# Patient Record
Sex: MALE | Race: BLACK OR AFRICAN AMERICAN | NOT HISPANIC OR LATINO | Employment: UNEMPLOYED | URBAN - METROPOLITAN AREA
[De-identification: names, ages, dates, MRNs, and addresses within clinical notes are randomized per-mention and may not be internally consistent; named-entity substitution may affect disease eponyms.]

---

## 2023-05-12 LAB
ALANINE AMINOTRANSFERASE (SGPT) (U/L) IN SER/PLAS: 21 U/L (ref 10–52)
ALBUMIN (G/DL) IN SER/PLAS: 4.3 G/DL (ref 3.4–5)
ALKALINE PHOSPHATASE (U/L) IN SER/PLAS: 81 U/L (ref 33–120)
ANION GAP IN SER/PLAS: 15 MMOL/L (ref 10–20)
ASPARTATE AMINOTRANSFERASE (SGOT) (U/L) IN SER/PLAS: 15 U/L (ref 9–39)
BASOPHILS (10*3/UL) IN BLOOD BY AUTOMATED COUNT: 0.06 X10E9/L (ref 0–0.1)
BASOPHILS/100 LEUKOCYTES IN BLOOD BY AUTOMATED COUNT: 0.9 % (ref 0–2)
BILIRUBIN TOTAL (MG/DL) IN SER/PLAS: 0.3 MG/DL (ref 0–1.2)
CALCIUM (MG/DL) IN SER/PLAS: 9.8 MG/DL (ref 8.6–10.6)
CARBON DIOXIDE, TOTAL (MMOL/L) IN SER/PLAS: 29 MMOL/L (ref 21–32)
CHLORIDE (MMOL/L) IN SER/PLAS: 103 MMOL/L (ref 98–107)
CHOLESTEROL (MG/DL) IN SER/PLAS: 210 MG/DL (ref 0–199)
CHOLESTEROL IN HDL (MG/DL) IN SER/PLAS: 42.8 MG/DL
CHOLESTEROL/HDL RATIO: 4.9
COBALAMIN (VITAMIN B12) (PG/ML) IN SER/PLAS: 487 PG/ML (ref 211–911)
CREATININE (MG/DL) IN SER/PLAS: 1.73 MG/DL (ref 0.5–1.3)
EOSINOPHILS (10*3/UL) IN BLOOD BY AUTOMATED COUNT: 0.46 X10E9/L (ref 0–0.7)
EOSINOPHILS/100 LEUKOCYTES IN BLOOD BY AUTOMATED COUNT: 6.6 % (ref 0–6)
ERYTHROCYTE DISTRIBUTION WIDTH (RATIO) BY AUTOMATED COUNT: 14 % (ref 11.5–14.5)
ERYTHROCYTE MEAN CORPUSCULAR HEMOGLOBIN CONCENTRATION (G/DL) BY AUTOMATED: 30.4 G/DL (ref 32–36)
ERYTHROCYTE MEAN CORPUSCULAR VOLUME (FL) BY AUTOMATED COUNT: 77 FL (ref 80–100)
ERYTHROCYTES (10*6/UL) IN BLOOD BY AUTOMATED COUNT: 5.71 X10E12/L (ref 4.5–5.9)
FERRITIN (UG/LL) IN SER/PLAS: 245 UG/L (ref 20–300)
GFR MALE: 53 ML/MIN/1.73M2
GLUCOSE (MG/DL) IN SER/PLAS: 83 MG/DL (ref 74–99)
HEMATOCRIT (%) IN BLOOD BY AUTOMATED COUNT: 44.1 % (ref 41–52)
HEMOGLOBIN (G/DL) IN BLOOD: 13.4 G/DL (ref 13.5–17.5)
IMMATURE GRANULOCYTES/100 LEUKOCYTES IN BLOOD BY AUTOMATED COUNT: 0.3 % (ref 0–0.9)
IRON (UG/DL) IN SER/PLAS: 65 UG/DL (ref 35–150)
IRON BINDING CAPACITY (UG/DL) IN SER/PLAS: 385 UG/DL (ref 240–445)
IRON SATURATION (%) IN SER/PLAS: 17 % (ref 25–45)
LDL: 135 MG/DL (ref 0–99)
LEUKOCYTES (10*3/UL) IN BLOOD BY AUTOMATED COUNT: 6.9 X10E9/L (ref 4.4–11.3)
LYMPHOCYTES (10*3/UL) IN BLOOD BY AUTOMATED COUNT: 1.71 X10E9/L (ref 1.2–4.8)
LYMPHOCYTES/100 LEUKOCYTES IN BLOOD BY AUTOMATED COUNT: 24.6 % (ref 13–44)
MONOCYTES (10*3/UL) IN BLOOD BY AUTOMATED COUNT: 0.53 X10E9/L (ref 0.1–1)
MONOCYTES/100 LEUKOCYTES IN BLOOD BY AUTOMATED COUNT: 7.6 % (ref 2–10)
NEUTROPHILS (10*3/UL) IN BLOOD BY AUTOMATED COUNT: 4.16 X10E9/L (ref 1.2–7.7)
NEUTROPHILS/100 LEUKOCYTES IN BLOOD BY AUTOMATED COUNT: 60 % (ref 40–80)
NRBC (PER 100 WBCS) BY AUTOMATED COUNT: 0 /100 WBC (ref 0–0)
PLATELETS (10*3/UL) IN BLOOD AUTOMATED COUNT: 224 X10E9/L (ref 150–450)
POTASSIUM (MMOL/L) IN SER/PLAS: 3.4 MMOL/L (ref 3.5–5.3)
PROTEIN TOTAL: 7.5 G/DL (ref 6.4–8.2)
SODIUM (MMOL/L) IN SER/PLAS: 144 MMOL/L (ref 136–145)
THYROTROPIN (MIU/L) IN SER/PLAS BY DETECTION LIMIT <= 0.05 MIU/L: 2.71 MIU/L (ref 0.44–3.98)
TRIGLYCERIDE (MG/DL) IN SER/PLAS: 162 MG/DL (ref 0–149)
UREA NITROGEN (MG/DL) IN SER/PLAS: 40 MG/DL (ref 6–23)
VLDL: 32 MG/DL (ref 0–40)

## 2023-09-28 PROBLEM — Q05.9 MYELOMENINGOCELE (MULTI): Status: ACTIVE | Noted: 2023-09-28

## 2023-09-28 PROBLEM — R05.9 COUGH: Status: ACTIVE | Noted: 2018-12-10

## 2023-09-28 PROBLEM — E55.9 VITAMIN D DEFICIENCY: Status: ACTIVE | Noted: 2023-09-28

## 2023-09-28 PROBLEM — B35.3 TINEA PEDIS: Status: ACTIVE | Noted: 2023-09-28

## 2023-09-28 PROBLEM — T50.2X1A: Status: ACTIVE | Noted: 2023-09-28

## 2023-09-28 PROBLEM — J30.2 SEASONAL ALLERGIES: Status: ACTIVE | Noted: 2020-11-16

## 2023-09-28 PROBLEM — T83.090A MECHANICAL COMPLICATION OF SUPRAPUBIC CATHETER (CMS-HCC): Status: ACTIVE | Noted: 2023-09-28

## 2023-09-28 PROBLEM — N13.30 HYDRONEPHROSIS, RIGHT: Status: ACTIVE | Noted: 2023-09-28

## 2023-09-28 PROBLEM — E66.9 OBESITY: Status: ACTIVE | Noted: 2022-11-28

## 2023-09-28 PROBLEM — R60.0 EDEMA OF FOOT: Status: ACTIVE | Noted: 2023-09-28

## 2023-09-28 PROBLEM — Q05.9 SPINA BIFIDA (MULTI): Status: ACTIVE | Noted: 2020-07-01

## 2023-09-28 PROBLEM — N18.30 STAGE 3 CHRONIC KIDNEY DISEASE (MULTI): Status: ACTIVE | Noted: 2023-05-22

## 2023-09-28 PROBLEM — N31.8 HYPERACTIVITY OF BLADDER: Status: ACTIVE | Noted: 2020-11-16

## 2023-09-28 PROBLEM — M88.9 OSTEITIS DEFORMANS: Status: ACTIVE | Noted: 2023-09-28

## 2023-09-28 PROBLEM — L03.90 CELLULITIS: Status: ACTIVE | Noted: 2018-09-11

## 2023-09-28 PROBLEM — N31.9 NEUROGENIC BLADDER: Status: ACTIVE | Noted: 2023-09-28

## 2023-09-28 PROBLEM — M85.80 ADVANCED BONE AGE: Status: ACTIVE | Noted: 2023-09-28

## 2023-09-28 PROBLEM — R82.90 ABNORMAL URINE SEDIMENT: Status: ACTIVE | Noted: 2018-10-30

## 2023-09-28 PROBLEM — G40.909 EPILEPSY (MULTI): Status: ACTIVE | Noted: 2023-09-28

## 2023-09-28 PROBLEM — N35.919 URETHRAL STRICTURE: Status: ACTIVE | Noted: 2023-09-28

## 2023-09-28 PROBLEM — D64.9 ANEMIA: Status: ACTIVE | Noted: 2021-11-18

## 2023-09-28 PROBLEM — N39.0 CHRONIC URINARY TRACT INFECTION: Status: ACTIVE | Noted: 2023-09-28

## 2023-09-28 PROBLEM — I87.2 PERIPHERAL VENOUS INSUFFICIENCY: Status: ACTIVE | Noted: 2023-09-28

## 2023-09-28 PROBLEM — R56.9 SEIZURES (MULTI): Status: ACTIVE | Noted: 2020-07-01

## 2023-09-28 PROBLEM — F32.A DEPRESSION: Status: ACTIVE | Noted: 2023-09-28

## 2023-09-28 PROBLEM — A41.9 SEPSIS (MULTI): Status: ACTIVE | Noted: 2023-09-28

## 2023-09-28 PROBLEM — T17.308A CHOKING: Status: ACTIVE | Noted: 2021-02-25

## 2023-09-28 PROBLEM — G40.909 SEIZURE DISORDER (MULTI): Status: ACTIVE | Noted: 2020-09-09

## 2023-09-28 PROBLEM — E63.9 POOR NUTRITION: Status: ACTIVE | Noted: 2023-09-28

## 2023-09-28 PROBLEM — R60.9 EDEMA: Status: ACTIVE | Noted: 2023-09-28

## 2023-09-28 PROBLEM — Z93.59 PRESENCE OF SUPRAPUBIC CATHETER (MULTI): Status: ACTIVE | Noted: 2020-09-09

## 2023-09-28 PROBLEM — E87.5 HYPERKALEMIA: Status: ACTIVE | Noted: 2023-09-28

## 2023-09-28 PROBLEM — N39.0 RECURRENT UTI: Status: ACTIVE | Noted: 2023-09-28

## 2023-09-28 PROBLEM — N12 PYELONEPHRITIS: Status: ACTIVE | Noted: 2021-07-23

## 2023-09-28 PROBLEM — R06.83 SNORING: Status: ACTIVE | Noted: 2022-11-28

## 2023-09-28 PROBLEM — F84.0 AUTISM (HHS-HCC): Status: ACTIVE | Noted: 2020-07-01

## 2023-09-28 PROBLEM — L03.119 CELLULITIS OF LOWER LEG: Status: ACTIVE | Noted: 2023-09-28

## 2023-09-28 PROBLEM — I10 BENIGN ESSENTIAL HTN: Status: ACTIVE | Noted: 2018-08-08

## 2023-09-28 RX ORDER — LEVOCETIRIZINE DIHYDROCHLORIDE 5 MG/1
1 TABLET, FILM COATED ORAL NIGHTLY
COMMUNITY
Start: 2019-10-30 | End: 2023-12-05 | Stop reason: WASHOUT

## 2023-09-28 RX ORDER — DOCUSATE SODIUM 100 MG/1
1 CAPSULE, LIQUID FILLED ORAL DAILY
COMMUNITY
Start: 2015-09-10 | End: 2024-05-13

## 2023-09-28 RX ORDER — OXYBUTYNIN CHLORIDE 5 MG/1
1 TABLET ORAL 3 TIMES DAILY
COMMUNITY
End: 2023-12-05 | Stop reason: WASHOUT

## 2023-09-28 RX ORDER — OXYBUTYNIN CHLORIDE 5 MG/1
1 TABLET ORAL 3 TIMES DAILY
COMMUNITY
Start: 2023-06-22 | End: 2024-05-13

## 2023-09-28 RX ORDER — LORATADINE 10 MG/1
1 TABLET ORAL DAILY
COMMUNITY
Start: 2015-09-10 | End: 2024-05-13

## 2023-09-28 RX ORDER — ATENOLOL 25 MG/1
1 TABLET ORAL DAILY
COMMUNITY
Start: 2014-01-09 | End: 2024-05-13

## 2023-09-28 RX ORDER — LEVETIRACETAM 250 MG/1
TABLET ORAL
COMMUNITY
Start: 2017-07-13

## 2023-09-28 RX ORDER — DEXTROMETHORPHAN HBR. AND GUAIFENESIN 10; 100 MG/5ML; MG/5ML
10 SOLUTION ORAL EVERY 4 HOURS PRN
COMMUNITY
Start: 2023-06-22 | End: 2024-06-06 | Stop reason: WASHOUT

## 2023-09-28 RX ORDER — ACETAMINOPHEN 325 MG/1
2 TABLET ORAL EVERY 6 HOURS PRN
COMMUNITY
Start: 2023-06-22 | End: 2024-03-25

## 2023-09-28 RX ORDER — AMLODIPINE BESYLATE 10 MG/1
1 TABLET ORAL DAILY
COMMUNITY
Start: 2014-01-09 | End: 2024-05-13

## 2023-09-28 RX ORDER — SERTRALINE HYDROCHLORIDE 25 MG/1
1 TABLET, FILM COATED ORAL 3 TIMES DAILY
COMMUNITY
Start: 2013-12-12 | End: 2024-05-13

## 2023-09-28 RX ORDER — LOPERAMIDE HYDROCHLORIDE 2 MG/1
2 CAPSULE ORAL
COMMUNITY
Start: 2023-06-22 | End: 2024-06-06

## 2023-09-28 RX ORDER — FLUTICASONE PROPIONATE 50 UG/1
POWDER, METERED RESPIRATORY (INHALATION)
COMMUNITY
Start: 2022-11-28 | End: 2023-12-05 | Stop reason: WASHOUT

## 2023-09-28 RX ORDER — HYDROCHLOROTHIAZIDE 25 MG/1
1 TABLET ORAL DAILY
COMMUNITY
Start: 2021-06-16 | End: 2024-05-13

## 2023-09-28 RX ORDER — SILVER SULFADIAZINE 10 G/1000G
CREAM TOPICAL
COMMUNITY
Start: 2017-11-30 | End: 2023-12-05 | Stop reason: WASHOUT

## 2023-10-05 ENCOUNTER — OFFICE VISIT (OUTPATIENT)
Dept: UROLOGY | Facility: CLINIC | Age: 33
End: 2023-10-05
Payer: MEDICAID

## 2023-10-05 VITALS — TEMPERATURE: 96.8 F | WEIGHT: 200 LBS | BODY MASS INDEX: 33.32 KG/M2 | HEIGHT: 65 IN

## 2023-10-05 DIAGNOSIS — N31.9 NEUROGENIC BLADDER: ICD-10-CM

## 2023-10-05 DIAGNOSIS — Z93.59 PRESENCE OF SUPRAPUBIC CATHETER (MULTI): Primary | ICD-10-CM

## 2023-10-05 PROCEDURE — 51102 DRAIN BL W/CATH INSERTION: CPT | Performed by: NURSE PRACTITIONER

## 2023-10-05 PROCEDURE — 99212 OFFICE O/P EST SF 10 MIN: CPT | Performed by: NURSE PRACTITIONER

## 2023-10-05 PROCEDURE — 1036F TOBACCO NON-USER: CPT | Performed by: NURSE PRACTITIONER

## 2023-10-05 RX ORDER — CIPROFLOXACIN 500 MG/1
500 TABLET ORAL SEE ADMIN INSTRUCTIONS
COMMUNITY
End: 2023-12-22 | Stop reason: SDUPTHER

## 2023-10-05 ASSESSMENT — COLUMBIA-SUICIDE SEVERITY RATING SCALE - C-SSRS
2. HAVE YOU ACTUALLY HAD ANY THOUGHTS OF KILLING YOURSELF?: NO
1. IN THE PAST MONTH, HAVE YOU WISHED YOU WERE DEAD OR WISHED YOU COULD GO TO SLEEP AND NOT WAKE UP?: NO
6. HAVE YOU EVER DONE ANYTHING, STARTED TO DO ANYTHING, OR PREPARED TO DO ANYTHING TO END YOUR LIFE?: NO

## 2023-10-05 ASSESSMENT — PATIENT HEALTH QUESTIONNAIRE - PHQ9
2. FEELING DOWN, DEPRESSED OR HOPELESS: NOT AT ALL
SUM OF ALL RESPONSES TO PHQ9 QUESTIONS 1 AND 2: 0
1. LITTLE INTEREST OR PLEASURE IN DOING THINGS: NOT AT ALL

## 2023-10-05 ASSESSMENT — PAIN SCALES - GENERAL: PAINLEVEL: 0-NO PAIN

## 2023-10-05 NOTE — PROGRESS NOTES
[facility]  Outpatient Clinic Note      Patient: Reinaldo Sotelo Jr.  Age/Sex: 33 y.o., male  MRN: [id number]      History of Present Illness  33 -year-old gentleman with history of urethral stricture and neurogenic bladder now with SPT presents for SPT exchange. No new complaints. He has not had any gross hematuria, fevers or chills. He is accompanied by caregiver.     Past Medical & Surgical History  Past Medical History:   Diagnosis Date    Personal history of other diseases of the circulatory system     History of hypertension    Personal history of other diseases of the respiratory system     Personal history of asthma    Personal history of other mental and behavioral disorders     History of mental retardation    Unspecified hydronephrosis 05/05/2021    Bilateral hydronephrosis      Past Surgical History:   Procedure Laterality Date    FOOT SURGERY  01/09/2014    Foot Surgery    HAND SURGERY  01/09/2014    Hand Surgery                                                                                                                                                          IR  BLADDER ASPIRATION  11/2/2016    IR  BLADDER ASPIRATION 11/2/2016 [facility] [place]    NECK SURGERY  01/09/2014    Neck Surgery          Labs  NA    Medications:  Current Outpatient Medications on File Prior to Visit   Medication Sig Dispense Refill    acetaminophen (Tylenol) 325 mg tablet Take 2 tablets (650 mg) by mouth every 6 hours if needed (pain). (MUSCLE, SKELETAL, JOINT, EYE, EAR, TOOTH, FOR HEADACHE)      amLODIPine (Norvasc) 10 mg tablet Take 1 tablet (10 mg) by mouth once daily.      atenolol (Tenormin) 25 mg tablet Take 1 tablet (25 mg) by mouth once daily.      cholecalciferol, vitamin D3, (VITAMIN D3 ORAL) Take 1 tablet by mouth once daily. for 30 day(s)      dextromethorphan-guaifenesin  mg/5 mL oral liquid Take 10 mL by mouth every 4 hours if needed for cough.      docusate sodium (Colace) 100 mg capsule  Take 1 capsule (100 mg) by mouth once daily.      fluticasone (Flovent Diskus) 50 mcg/actuation diskus inhaler Inhale 2 times a day.      hydroCHLOROthiazide (HYDRODiuril) 25 mg tablet Take 1 tablet (25 mg) by mouth once daily.      levETIRAcetam (Keppra) 250 mg tablet 250 mg in am and 375 in pm Orally for 30 day(s)      levocetirizine (Xyzal) 5 mg tablet Take 1 tablet (5 mg) by mouth once daily at bedtime.      loperamide (Imodium) 2 mg capsule Take 2 capsules (4 mg) by mouth. STAT THEN ONE CAPSULE BY MOUTH AFTER EACH LOOSE STOOL THEREAFTER FOR DIARRHEA      loratadine (Claritin) 10 mg tablet Take 1 tablet (10 mg) by mouth once daily.      neomycn/bacitrc/polymyx/pramox (TRIPLE ANTIBIOTIC PLUS TOP) APPLY TOPICALLY TWICE DAILY AS NEEDED FOR MINOR CUTS, ABRASIONS AND SKIN IRRITATIONS      NON FORMULARY Theraderm lotion, apply to skin daily      oxybutynin (Ditropan) 5 mg tablet Take 1 tablet (5 mg) by mouth 3 times a day.      oxybutynin (Ditropan) 5 mg tablet Take 1 tablet (5 mg) by mouth 3 times a day.      sertraline (Zoloft) 25 mg tablet Take 1 tablet (25 mg) by mouth 3 times a day. for 30 day(s)      silver sulfADIAZINE (SSD) 1 % cream APPLY TOPICALLY TO WOUND DAILY WITH BANDAID       No current facility-administered medications on file prior to visit.          Physical Exam                                                                                                                         Gen -  No acute distress     Neuro - Alert, oriented, conversant     Pulm - Symmetric chest rise, non-labored breathing     Abd - Soft, non-tender, non-distended     Ext - Warm, well perfused     Skin - without jaunice       Psych - appropriate tone, affect      - SP site clean and dry      Review of Systems  Review of Systems   All other systems reviewed and are negative.       Imaging  NA    Assessment & Plan  33 -year-old gentleman with history of urethral stricture and neurogenic bladder now with SPT presents for SPT  exchange. No new complaints. He has not had any gross hematuria, fevers or chills. He is accompanied by caregiver.     Has stable right hydronephrosis that needs intermittent imaging (last ultrasound in May 2021) Follows with nephrology, no recent imaging. Creatinine of 1.52 - GFR of 54     Bladder Catheterization    Date/Time: 10/5/2023 3:42 PM    Performed by: TONA Mello  Authorized by: TONA Mello    Procedure Details    Procedure: catheter insertion      Catheter insertion: suprapubic tube           16 straight silicone catheter exchanged with return of clear, yellow urine. Irrigated with return of clear, yellow urine.     Will plan on 3 day abx for empiric antibiotic coverage.      Brianna Pérez - [phone]; best contact for results     Will continue with monthly changes.      All questions and concerns were addressed.       Reviewed and approved by LEW CHANDLER on 10/5/23 at 3:33 PM.

## 2023-10-16 ENCOUNTER — OFFICE VISIT (OUTPATIENT)
Dept: PRIMARY CARE | Facility: CLINIC | Age: 33
End: 2023-10-16
Payer: MEDICAID

## 2023-10-16 VITALS — SYSTOLIC BLOOD PRESSURE: 126 MMHG | DIASTOLIC BLOOD PRESSURE: 70 MMHG | HEART RATE: 74 BPM | OXYGEN SATURATION: 98 %

## 2023-10-16 DIAGNOSIS — Z23 NEEDS FLU SHOT: ICD-10-CM

## 2023-10-16 DIAGNOSIS — Q05.9 SPINA BIFIDA, UNSPECIFIED HYDROCEPHALUS PRESENCE, UNSPECIFIED SPINAL REGION (MULTI): Primary | ICD-10-CM

## 2023-10-16 PROCEDURE — 99213 OFFICE O/P EST LOW 20 MIN: CPT | Performed by: NURSE PRACTITIONER

## 2023-10-16 PROCEDURE — 90686 IIV4 VACC NO PRSV 0.5 ML IM: CPT | Performed by: NURSE PRACTITIONER

## 2023-10-16 PROCEDURE — 3078F DIAST BP <80 MM HG: CPT | Performed by: NURSE PRACTITIONER

## 2023-10-16 PROCEDURE — 1036F TOBACCO NON-USER: CPT | Performed by: NURSE PRACTITIONER

## 2023-10-16 PROCEDURE — 3074F SYST BP LT 130 MM HG: CPT | Performed by: NURSE PRACTITIONER

## 2023-10-16 PROCEDURE — 90471 IMMUNIZATION ADMIN: CPT | Performed by: NURSE PRACTITIONER

## 2023-10-16 NOTE — PROGRESS NOTES
Subjective   Patient ID: Reinaldo Sotelo Jr. is a 33 y.o. male who presents for Med Refill (Patient here today for prescription for wheelchair and physical therapy referral.).    ROMEO Najera is a 32 yo M with spina bifida who presents for interval visit with group home caregiver  Pt is wheel chair bound and needs new power wheel chair and PT referral for evaluation/wheelchair care.   Pt is essentially nonverbal. He is using a manual wheel chair which is broken. He can self propel wheelchair but it is getting more difficult. He would benefit from power chair as he gets easily fatigued and due to progression of his chronic disease.   Pt is a 1 person transfer from wheelchair to bed/seat  He needs assistance with all ADLs  He cannot take steps or use cane/walker    Review of Systems  14 pt ROS negative    Objective   /70   Pulse 74   SpO2 98%     Physical Exam  black male in his high back wheelchair very pleasant    heart regular rate and rhythm   lungs clear auscultation   Skin warm and dry  Nonverbal   Follows simple commands  Equal hand grasp, decreased upper extremity ROM.  Bilateral lower extreities with poor control/weakness  Extremities no clubbing cyanosis or edema noted.  Baseline mental status per group home caregiver    Assessment/Plan   Diagnoses and all orders for this visit:  Spina bifida, unspecified hydrocephalus presence, unspecified spinal region (CMS/[facility])  -     Referral to Physical Therapy; Future  Needs flu shot  Other orders  -     Flu vaccine (IIV4) age 6 months and greater, preservative free  Face to face for power wheel chair certification and PT referral  He requires the use of a custom power wheelchair due to weakness in the upper and lower extremities resulting in the ability to safely ambulate or propel wheel chair. he is unable to accomplish basic in-home activities of daily living such as safely getting from the bedroom to kitchen for meals or bathroom for  toileting/hygiene.    Flu shot given  Indications, benefits and risks/SE discussed    Continue current plan of care

## 2023-10-16 NOTE — PATIENT INSTRUCTIONS
Thank you for choosing our office for your healthcare needs.    A flu shot was given today.  Monitor for any adverse reactions    A prescription for a power wheelchair was provided along with a physical therapy referral.  Please reach out to our office for any other needed documentation.

## 2023-11-09 ENCOUNTER — APPOINTMENT (OUTPATIENT)
Dept: UROLOGY | Facility: CLINIC | Age: 33
End: 2023-11-09
Payer: MEDICAID

## 2023-11-16 ENCOUNTER — OFFICE VISIT (OUTPATIENT)
Dept: UROLOGY | Facility: CLINIC | Age: 33
End: 2023-11-16
Payer: MEDICAID

## 2023-11-16 VITALS — BODY MASS INDEX: 33.32 KG/M2 | WEIGHT: 200 LBS | HEIGHT: 65 IN

## 2023-11-16 DIAGNOSIS — Z43.5 ENCOUNTER FOR CARE OR REPLACEMENT OF SUPRAPUBIC TUBE (MULTI): ICD-10-CM

## 2023-11-16 DIAGNOSIS — N31.9 NEUROGENIC BLADDER: Primary | ICD-10-CM

## 2023-11-16 PROCEDURE — 1036F TOBACCO NON-USER: CPT | Performed by: NURSE PRACTITIONER

## 2023-11-16 PROCEDURE — 99212 OFFICE O/P EST SF 10 MIN: CPT | Performed by: NURSE PRACTITIONER

## 2023-11-16 PROCEDURE — 51705 CHANGE OF BLADDER TUBE: CPT | Performed by: NURSE PRACTITIONER

## 2023-11-16 ASSESSMENT — PAIN SCALES - GENERAL: PAINLEVEL: 0-NO PAIN

## 2023-11-16 NOTE — PROGRESS NOTES
[facility]  Outpatient Clinic Note      Patient: Reinaldo Sotelo Jr.  Age/Sex: 33 y.o., male  MRN: [id number]      History of Present Illness  33 -year-old gentleman with history of urethral stricture and neurogenic bladder now with SPT presents for SPT exchange. No new complaints. He has not had any gross hematuria, fevers or chills. He is accompanied by caregiver.     Past Medical & Surgical History  Past Medical History:   Diagnosis Date    Personal history of other diseases of the circulatory system     History of hypertension    Personal history of other diseases of the respiratory system     Personal history of asthma    Personal history of other mental and behavioral disorders     History of mental retardation    Unspecified hydronephrosis 05/05/2021    Bilateral hydronephrosis      Past Surgical History:   Procedure Laterality Date    FOOT SURGERY  01/09/2014    Foot Surgery    HAND SURGERY  01/09/2014    Hand Surgery                                                                                                                                                          IR  BLADDER ASPIRATION  11/2/2016    IR  BLADDER ASPIRATION 11/2/2016 [facility] [place]GENCY [facility]    NECK SURGERY  01/09/2014    Neck Surgery          Labs  NA    Medications:  Current Outpatient Medications on File Prior to Visit   Medication Sig Dispense Refill    acetaminophen (Tylenol) 325 mg tablet Take 2 tablets (650 mg) by mouth every 6 hours if needed (pain). (MUSCLE, SKELETAL, JOINT, EYE, EAR, TOOTH, FOR HEADACHE)      amLODIPine (Norvasc) 10 mg tablet Take 1 tablet (10 mg) by mouth once daily.      atenolol (Tenormin) 25 mg tablet Take 1 tablet (25 mg) by mouth once daily.      cholecalciferol, vitamin D3, (VITAMIN D3 ORAL) Take 1 tablet by mouth once daily. for 30 day(s)      ciprofloxacin (Cipro) 500 mg tablet Take 1 tablet (500 mg) by mouth see administration instructions. Patient take one before and one after each catheter  change      dextromethorphan-guaifenesin  mg/5 mL oral liquid Take 10 mL by mouth every 4 hours if needed for cough.      docusate sodium (Colace) 100 mg capsule Take 1 capsule (100 mg) by mouth once daily.      fluticasone (Flovent Diskus) 50 mcg/actuation diskus inhaler Inhale 2 times a day.      hydroCHLOROthiazide (HYDRODiuril) 25 mg tablet Take 1 tablet (25 mg) by mouth once daily.      levETIRAcetam (Keppra) 250 mg tablet 250 mg in am and 375 in pm Orally for 30 day(s)      levocetirizine (Xyzal) 5 mg tablet Take 1 tablet (5 mg) by mouth once daily at bedtime.      loperamide (Imodium) 2 mg capsule Take 2 capsules (4 mg) by mouth. STAT THEN ONE CAPSULE BY MOUTH AFTER EACH LOOSE STOOL THEREAFTER FOR DIARRHEA      loratadine (Claritin) 10 mg tablet Take 1 tablet (10 mg) by mouth once daily.      neomycn/bacitrc/polymyx/pramox (TRIPLE ANTIBIOTIC PLUS TOP) APPLY TOPICALLY TWICE DAILY AS NEEDED FOR MINOR CUTS, ABRASIONS AND SKIN IRRITATIONS      NON FORMULARY Theraderm lotion, apply to skin daily      oxybutynin (Ditropan) 5 mg tablet Take 1 tablet (5 mg) by mouth 3 times a day.      oxybutynin (Ditropan) 5 mg tablet Take 1 tablet (5 mg) by mouth 3 times a day.      sertraline (Zoloft) 25 mg tablet Take 1 tablet (25 mg) by mouth 3 times a day. for 30 day(s)      silver sulfADIAZINE (SSD) 1 % cream APPLY TOPICALLY TO WOUND DAILY WITH BANDAID       No current facility-administered medications on file prior to visit.          Physical Exam                                                                                                                      General: Well developed, well nourished, alert and cooperative, appears in no acute distress  Eyes: Non-injected conjunctiva, sclera clear, no proptosis  Cardiac: Extremities are warm and well perfused. No edema, cyanosis or pallor.   Lungs: Breathing is easy, non-labored. Speaking in clear and complete sentences. Normal diaphragmatic movement.  MSK: Ambulatory  with steady gait, unassisted  Neuro: alert and oriented to person, place and time  Psych: Demonstrates good judgement and reason, without hallucinations, abnormal affect or abnormal behaviors.  Skin: no obvious lesions, no rashes      Review of Systems  Review of Systems   All other systems reviewed and are negative.         Imaging  NA      Assessment & Plan  33 -year-old gentleman with history of urethral stricture and neurogenic bladder now with SPT presents for SPT exchange. No new complaints. He has not had any gross hematuria, fevers or chills. He is accompanied by caregiver.     Has stable right hydronephrosis that needs intermittent imaging (last ultrasound in May 2021) Follows with nephrology, no recent imaging. Creatinine of 1.52 - GFR of 54    Bladder Catheterization    Date/Time: 11/16/2023 9:57 AM    Performed by: TONA Mello  Authorized by: TONA Mello    Procedure Details    Procedure: catheter insertion      Catheter insertion: suprapubic tube      Catheter size: 16 Fr    Complexity: complex            16 straight silicone catheter exchanged with return of clear, yellow urine. Irrigated with return of clear, yellow urine.     Will plan on 3 day abx for empiric antibiotic coverage.      Brianna Pérez - [phone]; best contact for results     Will continue with monthly changes.      All questions and concerns were addressed.     Reviewed and approved by LEW CHANDLER on 11/16/23 at 9:53 AM.

## 2023-12-05 ENCOUNTER — OFFICE VISIT (OUTPATIENT)
Dept: PRIMARY CARE | Facility: CLINIC | Age: 33
End: 2023-12-05
Payer: MEDICAID

## 2023-12-05 VITALS
SYSTOLIC BLOOD PRESSURE: 112 MMHG | BODY MASS INDEX: 33.28 KG/M2 | HEART RATE: 58 BPM | WEIGHT: 200 LBS | DIASTOLIC BLOOD PRESSURE: 70 MMHG

## 2023-12-05 DIAGNOSIS — Z00.00 WELL ADULT EXAM: Primary | ICD-10-CM

## 2023-12-05 DIAGNOSIS — N18.31 STAGE 3A CHRONIC KIDNEY DISEASE (MULTI): ICD-10-CM

## 2023-12-05 DIAGNOSIS — D64.9 ANEMIA, UNSPECIFIED TYPE: ICD-10-CM

## 2023-12-05 DIAGNOSIS — R06.83 SNORING: ICD-10-CM

## 2023-12-05 DIAGNOSIS — G40.909 SEIZURE DISORDER (MULTI): ICD-10-CM

## 2023-12-05 DIAGNOSIS — Z93.59 PRESENCE OF SUPRAPUBIC CATHETER (MULTI): ICD-10-CM

## 2023-12-05 DIAGNOSIS — I10 BENIGN ESSENTIAL HTN: ICD-10-CM

## 2023-12-05 PROCEDURE — G0439 PPPS, SUBSEQ VISIT: HCPCS | Performed by: PHYSICIAN ASSISTANT

## 2023-12-05 PROCEDURE — 3078F DIAST BP <80 MM HG: CPT | Performed by: PHYSICIAN ASSISTANT

## 2023-12-05 PROCEDURE — 3074F SYST BP LT 130 MM HG: CPT | Performed by: PHYSICIAN ASSISTANT

## 2023-12-05 PROCEDURE — 1036F TOBACCO NON-USER: CPT | Performed by: PHYSICIAN ASSISTANT

## 2023-12-05 RX ORDER — FLUTICASONE PROPIONATE 50 MCG
1 SPRAY, SUSPENSION (ML) NASAL DAILY
COMMUNITY
End: 2024-06-06

## 2023-12-05 ASSESSMENT — ENCOUNTER SYMPTOMS
SEIZURES: 1
HEMATURIA: 0
ABDOMINAL PAIN: 0
RESPIRATORY NEGATIVE: 1
COLOR CHANGE: 0

## 2023-12-05 ASSESSMENT — PAIN SCALES - GENERAL: PAINLEVEL: 0-NO PAIN

## 2023-12-05 NOTE — PROGRESS NOTES
Subjective   Patient ID: Reinaldo Sotelo Jr. is a 33 y.o. male who presents for Annual Exam (Group home requesting sleep study referral ).    Patient is here for CPE.  Has a history of spina bifida with hydrocephalus.  He has a neurogenic bladder sees urology on regular basis for his catheter change.  Also has seizure disorder but fortunately has not had any seizures recently.  Has history of OCD takes Zoloft on a regular basis.  He does have a motorized wheelchair but apparently there is a battery issue and is not currently using it.  Nurse manager does request a sleep study test.  Apparently he snores quite a bit.  He does not sleep very well at night and usually sleeps in the morning from about 9 AM to about 1 PM.       Review of Systems   HENT:  Negative for dental problem.         Loud snoring   Respiratory: Negative.     Cardiovascular:  Negative for leg swelling.   Gastrointestinal:  Negative for abdominal pain.   Genitourinary:  Negative for hematuria.   Skin:  Negative for color change.   Neurological:  Positive for seizures.       Objective   /70   Pulse 58   Wt 90.7 kg (200 lb)   BMI 33.28 kg/m²     Physical Exam  Constitutional:       Appearance: He is obese.      Comments: Sitting in his wheelchair.He is relatively nonverbal   HENT:      Right Ear: Tympanic membrane normal.      Left Ear: Tympanic membrane normal.      Nose: Nose normal.      Mouth/Throat:      Mouth: Mucous membranes are moist.   Eyes:      Extraocular Movements: Extraocular movements intact.      Pupils: Pupils are equal, round, and reactive to light.   Cardiovascular:      Rate and Rhythm: Normal rate and regular rhythm.      Pulses: Normal pulses.      Heart sounds: No murmur heard.  Pulmonary:      Effort: Pulmonary effort is normal.      Breath sounds: No wheezing.   Abdominal:      General: Abdomen is flat.      Tenderness: There is no abdominal tenderness.   Musculoskeletal:      Cervical back: Normal range of motion. No  tenderness.      Right lower leg: No edema.      Left lower leg: No edema.   Neurological:      General: No focal deficit present.      Mental Status: Mental status is at baseline.       Assessment/Plan   Diagnoses and all orders for this visit:  Well adult exam  Benign essential HTN  -     Comprehensive Metabolic Panel; Future  Anemia, unspecified type  -     CBC and Auto Differential; Future  Seizure disorder (CMS/HCC)  Snoring  Stage 3a chronic kidney disease (CMS/HCC)  Presence of suprapubic catheter (CMS/HCC)    Will obtain a home sleep study test.

## 2023-12-07 ENCOUNTER — APPOINTMENT (OUTPATIENT)
Dept: UROLOGY | Facility: CLINIC | Age: 33
End: 2023-12-07
Payer: MEDICAID

## 2023-12-08 DIAGNOSIS — R06.83 SNORING: Primary | ICD-10-CM

## 2023-12-14 ENCOUNTER — APPOINTMENT (OUTPATIENT)
Dept: UROLOGY | Facility: CLINIC | Age: 33
End: 2023-12-14
Payer: MEDICAID

## 2023-12-21 ENCOUNTER — APPOINTMENT (OUTPATIENT)
Dept: UROLOGY | Facility: CLINIC | Age: 33
End: 2023-12-21
Payer: MEDICAID

## 2023-12-22 ENCOUNTER — OFFICE VISIT (OUTPATIENT)
Dept: UROLOGY | Facility: HOSPITAL | Age: 33
End: 2023-12-22
Payer: MEDICAID

## 2023-12-22 DIAGNOSIS — N31.9 NEUROGENIC BLADDER: Primary | ICD-10-CM

## 2023-12-22 DIAGNOSIS — Z93.59 PRESENCE OF SUPRAPUBIC CATHETER (MULTI): ICD-10-CM

## 2023-12-22 PROCEDURE — 1036F TOBACCO NON-USER: CPT | Performed by: NURSE PRACTITIONER

## 2023-12-22 PROCEDURE — 51703 INSERT BLADDER CATH COMPLEX: CPT | Performed by: NURSE PRACTITIONER

## 2023-12-22 PROCEDURE — 99213 OFFICE O/P EST LOW 20 MIN: CPT | Performed by: NURSE PRACTITIONER

## 2023-12-22 RX ORDER — CIPROFLOXACIN 500 MG/1
500 TABLET ORAL SEE ADMIN INSTRUCTIONS
Qty: 3 TABLET | Refills: 0 | Status: SHIPPED | OUTPATIENT
Start: 2023-12-22 | End: 2023-12-25

## 2023-12-22 NOTE — PROGRESS NOTES
Subjective   Patient ID: Reinaldo Sotelo Jr. is a 33 y.o. male presenting for SPT exchange     HPI  33 -year-old gentleman with history of urethral stricture and neurogenic bladder now with SPT presents for SPT exchange. No new complaints. He has not had any gross hematuria, fevers or chills. He is accompanied by caregiver.      Review of Systems  All other systems have been reviewed and are negative for complaint.    Objective   Physical Exam  General: Well developed, well nourished, alert and cooperative, appears in no acute distress  Eyes: Non-injected conjunctiva, sclera clear, no proptosis  Cardiac: Extremities are warm and well perfused. No edema, cyanosis or pallor.   Lungs: Breathing is easy, non-labored. Speaking in clear and complete sentences. Normal diaphragmatic movement.  MSK: Ambulatory with steady gait, unassisted  Neuro: alert and oriented to person, place and time  Psych: Demonstrates good judgement and reason, without hallucinations, abnormal affect or abnormal behaviors.  Skin: no obvious lesions, no rashes.    Assessment/Plan   Diagnoses and all orders for this visit:  Neurogenic bladder  Presence of suprapubic catheter (CMS/[facility])      33 -year-old gentleman with history of urethral stricture and neurogenic bladder now with SPT presents for SPT exchange. No new complaints. He has not had any gross hematuria, fevers or chills. He is accompanied by caregiver.     Has stable right hydronephrosis that needs intermittent imaging (last ultrasound in May 2021) Follows with nephrology, no recent imaging.     Bladder Catheterization     Procedure Details    Procedure: catheter insertion      Catheter insertion: suprapubic tube      Catheter size: 16 Fr    Complexity: complex      16 straight silicone catheter exchanged with return of clear, yellow urine. Irrigated with return of clear, yellow urine.     Will plan on 3 day abx for empiric antibiotic coverage.      Brianna Pérez - [phone]; best contact  for results     Will continue with monthly changes.      All questions and concerns were addressed. Patient verbalizes understanding and has no other questions at this time.   Jessica Stewart-- RAS SHANKS  Office Phone:  [phone]

## 2024-01-24 NOTE — PROGRESS NOTES
Subjective   Patient ID: Reinaldo Sotelo Jr. is a 33 y.o. male presenting for SPT exchange (16F straight silicone)    HPI  33 -year-old gentleman with history of urethral stricture and neurogenic bladder now with SPT presents for SPT exchange. No new complaints. He has not had any gross hematuria, fevers or chills. He is accompanied by caregiver.      Review of Systems  All other systems have been reviewed and are negative for complaint.    Objective   Physical Exam  General: Well developed, well nourished, alert and cooperative, appears in no acute distress  Eyes: Non-injected conjunctiva, sclera clear, no proptosis  Cardiac: Extremities are warm and well perfused. No edema, cyanosis or pallor.   Lungs: Breathing is easy, non-labored. Speaking in clear and complete sentences. Normal diaphragmatic movement.  MSK: Ambulatory with steady gait, unassisted  Neuro: alert and oriented to person, place and time  Psych: Demonstrates good judgement and reason, without hallucinations, abnormal affect or abnormal behaviors.  Skin: no obvious lesions, no rashes.    Assessment/Plan   Diagnoses and all orders for this visit:  Presence of suprapubic catheter (CMS/HCC)  Neurogenic bladder      33 -year-old gentleman with history of urethral stricture and neurogenic bladder now with SPT presents for SPT exchange. No new complaints. He has not had any gross hematuria, fevers or chills. He is accompanied by caregiver.     Has stable right hydronephrosis that needs intermittent imaging (last ultrasound in May 2021) Follows with nephrology, no recent imaging.     Bladder Catheterization     Procedure Details    Procedure: catheter insertion      Catheter insertion: suprapubic tube      Catheter size: 16 Fr    Complexity: complex      16 straight silicone catheter exchanged with return of clear, yellow urine. Irrigated with return of clear, yellow urine.     Will plan on 3 day abx for empiric antibiotic coverage.      Brianna Mcdonald  [phone]; best contact for results     Will continue with monthly changes.      All questions and concerns were addressed. Patient verbalizes understanding and has no other questions at this time.   Jessica Stewart-- RAS SHANKS  Office Phone:  [phone]

## 2024-01-25 ENCOUNTER — OFFICE VISIT (OUTPATIENT)
Dept: UROLOGY | Facility: CLINIC | Age: 34
End: 2024-01-25
Payer: MEDICAID

## 2024-01-25 DIAGNOSIS — Z93.59 PRESENCE OF SUPRAPUBIC CATHETER (MULTI): Primary | ICD-10-CM

## 2024-01-25 DIAGNOSIS — N31.9 NEUROGENIC BLADDER: ICD-10-CM

## 2024-01-25 PROCEDURE — 51705 CHANGE OF BLADDER TUBE: CPT | Performed by: NURSE PRACTITIONER

## 2024-01-25 PROCEDURE — 1036F TOBACCO NON-USER: CPT | Performed by: NURSE PRACTITIONER

## 2024-01-25 PROCEDURE — 99213 OFFICE O/P EST LOW 20 MIN: CPT | Performed by: NURSE PRACTITIONER

## 2024-02-27 ENCOUNTER — TELEPHONE (OUTPATIENT)
Dept: PRIMARY CARE | Facility: CLINIC | Age: 34
End: 2024-02-27
Payer: MEDICAID

## 2024-02-27 DIAGNOSIS — R40.0 DAYTIME SOMNOLENCE: ICD-10-CM

## 2024-02-27 DIAGNOSIS — R06.83 SNORING: Primary | ICD-10-CM

## 2024-02-27 DIAGNOSIS — Q05.9 SPINA BIFIDA, UNSPECIFIED HYDROCEPHALUS PRESENCE, UNSPECIFIED SPINAL REGION (MULTI): ICD-10-CM

## 2024-02-27 PROBLEM — N32.81 OVERACTIVE BLADDER: Status: ACTIVE | Noted: 2020-11-16

## 2024-02-27 PROBLEM — Z93.50: Status: ACTIVE | Noted: 2020-09-09

## 2024-02-27 PROBLEM — N31.9 NEUROGENIC DYSFUNCTION OF THE URINARY BLADDER: Status: ACTIVE | Noted: 2020-11-16

## 2024-02-27 PROBLEM — E66.9 CLASS 1 OBESITY: Status: ACTIVE | Noted: 2020-09-09

## 2024-02-27 PROBLEM — T83.9XXA URINARY CATHETER COMPLICATION (CMS-HCC): Status: ACTIVE | Noted: 2023-09-28

## 2024-02-27 PROBLEM — E66.811 CLASS 1 OBESITY: Status: ACTIVE | Noted: 2020-09-09

## 2024-02-27 RX ORDER — LEVOCETIRIZINE DIHYDROCHLORIDE 5 MG/1
5 TABLET, FILM COATED ORAL EVERY EVENING
COMMUNITY
Start: 2019-10-30

## 2024-02-27 NOTE — TELEPHONE ENCOUNTER
Brianna called from [facility]  [phone] said the last time he was in he was to have a sleep study referral no one has contacted them

## 2024-02-28 NOTE — TELEPHONE ENCOUNTER
Spoke with Brianna, informed her that another referral has been put in. Phone numbers in chart updated to current caregivers, Brianna, and Rhina. Patient is non verbal.

## 2024-02-29 ENCOUNTER — OFFICE VISIT (OUTPATIENT)
Dept: UROLOGY | Facility: CLINIC | Age: 34
End: 2024-02-29
Payer: MEDICAID

## 2024-02-29 DIAGNOSIS — N31.9 NEUROGENIC BLADDER: Primary | ICD-10-CM

## 2024-02-29 DIAGNOSIS — Z93.59 PRESENCE OF SUPRAPUBIC CATHETER (MULTI): ICD-10-CM

## 2024-02-29 PROCEDURE — 1036F TOBACCO NON-USER: CPT | Performed by: NURSE PRACTITIONER

## 2024-02-29 PROCEDURE — 51705 CHANGE OF BLADDER TUBE: CPT | Performed by: NURSE PRACTITIONER

## 2024-02-29 PROCEDURE — 99213 OFFICE O/P EST LOW 20 MIN: CPT | Performed by: NURSE PRACTITIONER

## 2024-02-29 NOTE — PROGRESS NOTES
Subjective   Patient ID: Eric Sotelo Jr. is a 33 y.o. male presenting for SPT exchange (16F straight silicone)    HPI  33 -year-old gentleman with history of urethral stricture and neurogenic bladder now with SPT presents for SPT exchange. No new complaints. He has not had any gross hematuria, fevers or chills. He is accompanied by caregiver.      Review of Systems  All other systems have been reviewed and are negative for complaint.    Objective   Physical Exam  General: Well developed, well nourished, alert and cooperative, appears in no acute distress  Eyes: Non-injected conjunctiva, sclera clear, no proptosis  Cardiac: Extremities are warm and well perfused. No edema, cyanosis or pallor.   Lungs: Breathing is easy, non-labored. Speaking in clear and complete sentences. Normal diaphragmatic movement.  MSK: Ambulatory with steady gait, unassisted  Neuro: alert and oriented to person, place and time  Psych: Demonstrates good judgement and reason, without hallucinations, abnormal affect or abnormal behaviors.  Skin: no obvious lesions, no rashes.    Assessment/Plan   There are no diagnoses linked to this encounter.      33 -year-old gentleman with history of urethral stricture and neurogenic bladder now with SPT presents for SPT exchange. No new complaints. He has not had any gross hematuria, fevers or chills. He is accompanied by caregiver.     Has stable right hydronephrosis that needs intermittent imaging (last ultrasound in May 2021) Follows with nephrology, no recent imaging.     Bladder Catheterization     Procedure Details    Procedure: catheter insertion      Catheter insertion: suprapubic tube      Catheter size: 16 Fr    Complexity: complex      16 straight silicone catheter exchanged with return of clear, yellow urine. Irrigated with return of clear, yellow urine.     Will plan on 3 day abx for empiric antibiotic coverage.      Brianna Pérez - [phone]; best contact for results     Will continue with  monthly changes.      All questions and concerns were addressed. Patient verbalizes understanding and has no other questions at this time.   Jessica Stewart-- RAS SHANKS  Office Phone:  [phone]

## 2024-03-19 ENCOUNTER — PATIENT MESSAGE (OUTPATIENT)
Dept: PRIMARY CARE | Facility: CLINIC | Age: 34
End: 2024-03-19

## 2024-03-25 DIAGNOSIS — R56.9 SEIZURES (MULTI): ICD-10-CM

## 2024-03-25 RX ORDER — ACETAMINOPHEN 325 MG/1
TABLET ORAL
Qty: 60 TABLET | Refills: 11 | Status: SHIPPED | OUTPATIENT
Start: 2024-03-25

## 2024-03-29 ENCOUNTER — OFFICE VISIT (OUTPATIENT)
Dept: UROLOGY | Facility: HOSPITAL | Age: 34
End: 2024-03-29
Payer: MEDICAID

## 2024-03-29 DIAGNOSIS — Z93.59 PRESENCE OF SUPRAPUBIC CATHETER (MULTI): ICD-10-CM

## 2024-03-29 DIAGNOSIS — N31.9 NEUROGENIC BLADDER: Primary | ICD-10-CM

## 2024-03-29 PROCEDURE — 51703 INSERT BLADDER CATH COMPLEX: CPT | Performed by: NURSE PRACTITIONER

## 2024-03-29 PROCEDURE — 99213 OFFICE O/P EST LOW 20 MIN: CPT | Performed by: NURSE PRACTITIONER

## 2024-03-29 PROCEDURE — 51705 CHANGE OF BLADDER TUBE: CPT | Performed by: NURSE PRACTITIONER

## 2024-03-29 NOTE — PROGRESS NOTES
Subjective   Patient ID: Eric Sotelo Jr. is a 33 y.o. male presenting for SPT exchange (16F straight silicone)    HPI  33 -year-old gentleman with history of urethral stricture and neurogenic bladder now with SPT presents for SPT exchange. No new complaints. He has not had any gross hematuria, fevers or chills. He is accompanied by caregiver.      Review of Systems  All other systems have been reviewed and are negative for complaint.    Objective   Physical Exam  General: Well developed, well nourished, alert and cooperative, appears in no acute distress  Eyes: Non-injected conjunctiva, sclera clear, no proptosis  Cardiac: Extremities are warm and well perfused. No edema, cyanosis or pallor.   Lungs: Breathing is easy, non-labored. Speaking in clear and complete sentences. Normal diaphragmatic movement.  MSK: Ambulatory with steady gait, unassisted  Neuro: alert and oriented to person, place and time  Psych: Demonstrates good judgement and reason, without hallucinations, abnormal affect or abnormal behaviors.  Skin: no obvious lesions, no rashes.    Assessment/Plan   There are no diagnoses linked to this encounter.      33 -year-old gentleman with history of urethral stricture and neurogenic bladder now with SPT presents for SPT exchange. No new complaints. He has not had any gross hematuria, fevers or chills. He is accompanied by caregiver.     Has stable right hydronephrosis that needs intermittent imaging (last ultrasound in May 2021) Follows with nephrology, no recent imaging.     Bladder Catheterization     Procedure Details    Procedure: catheter insertion      Catheter insertion: suprapubic tube      Catheter size: 16 Fr    Complexity: complex      16 straight silicone catheter exchanged with return of clear, yellow urine. Irrigated with return of clear, yellow urine.     Will plan on 3 day abx for empiric antibiotic coverage.      Brianna Pérez - [phone]; best contact for results     Will continue with  monthly changes.      All questions and concerns were addressed. Patient verbalizes understanding and has no other questions at this time.   Jessica Stewart-- RAS SHANKS  Office Phone:  [phone]

## 2024-04-04 ENCOUNTER — APPOINTMENT (OUTPATIENT)
Dept: UROLOGY | Facility: CLINIC | Age: 34
End: 2024-04-04
Payer: MEDICAID

## 2024-04-28 NOTE — PROGRESS NOTES
Subjective   Patient ID: Eric Sotelo Jr. is a 33 y.o. male presenting for SPT exchange (16F straight silicone)    HPI  33 -year-old gentleman with history of urethral stricture and neurogenic bladder now with SPT presents for SPT exchange. No new complaints. He has not had any gross hematuria, fevers or chills. He is accompanied by caregiver.      Review of Systems  All other systems have been reviewed and are negative for complaint.    Objective   Physical Exam  General: Well developed, well nourished, alert and cooperative, appears in no acute distress  Eyes: Non-injected conjunctiva, sclera clear, no proptosis  Cardiac: Extremities are warm and well perfused. No edema, cyanosis or pallor.   Lungs: Breathing is easy, non-labored. Speaking in clear and complete sentences. Normal diaphragmatic movement.  MSK: Ambulatory with steady gait, unassisted  Neuro: alert and oriented to person, place and time  Psych: Demonstrates good judgement and reason, without hallucinations, abnormal affect or abnormal behaviors.  Skin: no obvious lesions, no rashes.    Assessment/Plan   There are no diagnoses linked to this encounter.      33 -year-old gentleman with history of urethral stricture and neurogenic bladder now with SPT presents for SPT exchange. No new complaints. He has not had any gross hematuria, fevers or chills. He is accompanied by caregiver.     Has stable right hydronephrosis that needs intermittent imaging (last ultrasound in May 2021) Follows with nephrology, no recent imaging.     Bladder Catheterization     Procedure Details    Procedure: catheter insertion      Catheter insertion: suprapubic tube      Catheter size: 16 Fr Silicone    Complexity: complex      16 straight silicone catheter exchanged with return of clear, yellow urine. Irrigated with return of clear, yellow urine.     Will plan on 3 day abx for empiric antibiotic coverage.      Brianna Pérez - [phone]; best contact for results     Will  continue with monthly changes.      All questions and concerns were addressed. Patient verbalizes understanding and has no other questions at this time.   Jessica Stewart-- RAS SHANKS  Office Phone:  [phone]

## 2024-04-29 ENCOUNTER — OFFICE VISIT (OUTPATIENT)
Dept: UROLOGY | Facility: HOSPITAL | Age: 34
End: 2024-04-29
Payer: MEDICAID

## 2024-04-29 DIAGNOSIS — N31.9 NEUROGENIC BLADDER: Primary | ICD-10-CM

## 2024-04-29 DIAGNOSIS — Z43.5 ENCOUNTER FOR CARE OR REPLACEMENT OF SUPRAPUBIC TUBE (MULTI): ICD-10-CM

## 2024-04-29 DIAGNOSIS — Z93.59 PRESENCE OF SUPRAPUBIC CATHETER (MULTI): ICD-10-CM

## 2024-04-29 PROCEDURE — 51703 INSERT BLADDER CATH COMPLEX: CPT | Performed by: NURSE PRACTITIONER

## 2024-04-29 PROCEDURE — 99213 OFFICE O/P EST LOW 20 MIN: CPT | Performed by: NURSE PRACTITIONER

## 2024-04-29 PROCEDURE — 51705 CHANGE OF BLADDER TUBE: CPT | Performed by: NURSE PRACTITIONER

## 2024-05-02 ENCOUNTER — APPOINTMENT (OUTPATIENT)
Dept: UROLOGY | Facility: CLINIC | Age: 34
End: 2024-05-02
Payer: MEDICAID

## 2024-05-11 DIAGNOSIS — N32.81 OVERACTIVE BLADDER: ICD-10-CM

## 2024-05-11 DIAGNOSIS — F32.A DEPRESSIVE DISORDER: ICD-10-CM

## 2024-05-11 DIAGNOSIS — I10 BENIGN ESSENTIAL HYPERTENSION: ICD-10-CM

## 2024-05-11 DIAGNOSIS — K59.00 CONSTIPATION, UNSPECIFIED CONSTIPATION TYPE: ICD-10-CM

## 2024-05-11 DIAGNOSIS — J30.2 SEASONAL ALLERGIES: ICD-10-CM

## 2024-05-11 DIAGNOSIS — N18.31 STAGE 3A CHRONIC KIDNEY DISEASE (MULTI): ICD-10-CM

## 2024-05-13 RX ORDER — HYDROCHLOROTHIAZIDE 25 MG/1
TABLET ORAL
Qty: 31 TABLET | Refills: 11 | Status: SHIPPED | OUTPATIENT
Start: 2024-05-13

## 2024-05-13 RX ORDER — AMLODIPINE BESYLATE 10 MG/1
TABLET ORAL
Qty: 31 TABLET | Refills: 11 | Status: SHIPPED | OUTPATIENT
Start: 2024-05-13

## 2024-05-13 RX ORDER — DOCUSATE SODIUM 100 MG/1
CAPSULE, LIQUID FILLED ORAL
Qty: 31 CAPSULE | Refills: 11 | Status: SHIPPED | OUTPATIENT
Start: 2024-05-13

## 2024-05-13 RX ORDER — SERTRALINE HYDROCHLORIDE 25 MG/1
TABLET, FILM COATED ORAL
Qty: 93 TABLET | Refills: 11 | Status: SHIPPED | OUTPATIENT
Start: 2024-05-13

## 2024-05-13 RX ORDER — ATENOLOL 25 MG/1
TABLET ORAL
Qty: 31 TABLET | Refills: 11 | Status: SHIPPED | OUTPATIENT
Start: 2024-05-13

## 2024-05-13 RX ORDER — LORATADINE 10 MG/1
TABLET ORAL
Qty: 31 TABLET | Refills: 11 | Status: SHIPPED | OUTPATIENT
Start: 2024-05-13

## 2024-05-13 RX ORDER — OXYBUTYNIN CHLORIDE 5 MG/1
TABLET ORAL
Qty: 93 TABLET | Refills: 11 | Status: SHIPPED | OUTPATIENT
Start: 2024-05-13

## 2024-06-03 ENCOUNTER — APPOINTMENT (OUTPATIENT)
Dept: UROLOGY | Facility: HOSPITAL | Age: 34
End: 2024-06-03
Payer: MEDICAID

## 2024-06-04 NOTE — PROGRESS NOTES
Subjective   Patient ID: Eric Sotelo Jr. is a 33 y.o. male presenting for SPT exchange (16F straight silicone)    HPI  33 -year-old gentleman with history of urethral stricture and neurogenic bladder now with SPT presents for SPT exchange. No new complaints. He has not had any gross hematuria, fevers or chills. He is accompanied by caregiver.      Review of Systems  All other systems have been reviewed and are negative for complaint.    Objective   Physical Exam  General: Well developed, well nourished, alert and cooperative, appears in no acute distress  Eyes: Non-injected conjunctiva, sclera clear, no proptosis  Cardiac: Extremities are warm and well perfused. No edema, cyanosis or pallor.   Lungs: Breathing is easy, non-labored. Speaking in clear and complete sentences. Normal diaphragmatic movement.  MSK: Ambulatory with steady gait, unassisted  Neuro: alert and oriented to person, place and time  Psych: Demonstrates good judgement and reason, without hallucinations, abnormal affect or abnormal behaviors.  Skin: no obvious lesions, no rashes.    Assessment/Plan   There are no diagnoses linked to this encounter.      33 -year-old gentleman with history of urethral stricture and neurogenic bladder now with SPT presents for SPT exchange. No new complaints. He has not had any gross hematuria, fevers or chills. He is accompanied by caregiver.     Has stable right hydronephrosis that needs intermittent imaging (last ultrasound in May 2021) Follows with nephrology, no recent imaging.     Bladder Catheterization     Procedure Details    Procedure: catheter insertion      Catheter insertion: suprapubic tube      Catheter size: 16 Fr Silicone    Complexity: complex      16 straight silicone catheter exchanged with return of clear, yellow urine. Irrigated with return of clear, yellow urine.     Will plan on 3 day abx for empiric antibiotic coverage.      Brianna Pérez - [phone]; best contact for results     Will  continue with monthly changes.      All questions and concerns were addressed. Patient verbalizes understanding and has no other questions at this time.   Jessica Stewart-- RAS SHANKS  Office Phone:  [phone]

## 2024-06-05 ENCOUNTER — OFFICE VISIT (OUTPATIENT)
Dept: UROLOGY | Facility: HOSPITAL | Age: 34
End: 2024-06-05
Payer: MEDICAID

## 2024-06-05 DIAGNOSIS — Z43.5 ENCOUNTER FOR CARE OR REPLACEMENT OF SUPRAPUBIC TUBE (MULTI): ICD-10-CM

## 2024-06-05 DIAGNOSIS — Z93.59 PRESENCE OF SUPRAPUBIC CATHETER (MULTI): ICD-10-CM

## 2024-06-05 DIAGNOSIS — J30.2 SEASONAL ALLERGIES: ICD-10-CM

## 2024-06-05 DIAGNOSIS — N31.9 NEUROGENIC BLADDER: Primary | ICD-10-CM

## 2024-06-05 DIAGNOSIS — G40.909 SEIZURE DISORDER (MULTI): ICD-10-CM

## 2024-06-05 PROCEDURE — 51703 INSERT BLADDER CATH COMPLEX: CPT | Performed by: NURSE PRACTITIONER

## 2024-06-05 PROCEDURE — 99213 OFFICE O/P EST LOW 20 MIN: CPT | Performed by: NURSE PRACTITIONER

## 2024-06-05 PROCEDURE — 51705 CHANGE OF BLADDER TUBE: CPT | Performed by: NURSE PRACTITIONER

## 2024-06-06 RX ORDER — FLUTICASONE PROPIONATE 50 MCG
SPRAY, SUSPENSION (ML) NASAL
Qty: 16 G | Refills: 11 | Status: SHIPPED | OUTPATIENT
Start: 2024-06-06

## 2024-06-06 RX ORDER — DEXTROMETHORPHAN HYDROBROMIDE, GUAIFENESIN 10; 100 MG/5ML; MG/5ML
10 LIQUID ORAL EVERY 4 HOURS PRN
Qty: 120 ML | Refills: 11 | Status: SHIPPED | OUTPATIENT
Start: 2024-06-06

## 2024-06-06 RX ORDER — LANOLIN/MINERAL OIL
LOTION (ML) TOPICAL
Qty: 236 ML | Refills: 11 | Status: SHIPPED | OUTPATIENT
Start: 2024-06-06

## 2024-06-06 RX ORDER — NYSTATIN 100000 U/G
CREAM TOPICAL
Qty: 30 G | Refills: 11 | Status: SHIPPED | OUTPATIENT
Start: 2024-06-06

## 2024-06-06 RX ORDER — LOPERAMIDE HYDROCHLORIDE 2 MG/1
CAPSULE ORAL
Qty: 14 CAPSULE | Refills: 11 | Status: SHIPPED | OUTPATIENT
Start: 2024-06-06

## 2024-06-23 NOTE — PROGRESS NOTES
Subjective   Patient ID: Eric Sotelo Jr. is a 33 y.o. male presenting for SPT exchange (16F straight silicone)    HPI  33 -year-old gentleman with history of urethral stricture and neurogenic bladder now with SPT presents for SPT exchange. No new complaints. He has not had any gross hematuria, fevers or chills. He is accompanied by caregiver.      Review of Systems  All other systems have been reviewed and are negative for complaint.    Objective   Physical Exam  General: Well developed, well nourished, alert and cooperative, appears in no acute distress  Eyes: Non-injected conjunctiva, sclera clear, no proptosis  Cardiac: Extremities are warm and well perfused. No edema, cyanosis or pallor.   Lungs: Breathing is easy, non-labored. Speaking in clear and complete sentences. Normal diaphragmatic movement.  MSK: Ambulatory with steady gait, unassisted  Neuro: alert and oriented to person, place and time  Psych: Demonstrates good judgement and reason, without hallucinations, abnormal affect or abnormal behaviors.  Skin: no obvious lesions, no rashes.    Assessment/Plan   There are no diagnoses linked to this encounter.      33 -year-old gentleman with history of urethral stricture and neurogenic bladder now with SPT presents for SPT exchange. No new complaints. He has not had any gross hematuria, fevers or chills. He is accompanied by caregiver.     Has stable right hydronephrosis that needs intermittent imaging (last ultrasound in May 2021) Follows with nephrology, no recent imaging.     Bladder Catheterization     Procedure Details    Procedure: catheter insertion      Catheter insertion: suprapubic tube      Catheter size: 16 Fr Silicone    Complexity: complex      16 straight silicone catheter exchanged with return of clear, yellow urine. Irrigated with return of clear, yellow urine.     Will plan on 3 day abx for empiric antibiotic coverage.      Brianna Pérez - [phone]; best contact for results     Will  continue with monthly changes.      All questions and concerns were addressed. Patient verbalizes understanding and has no other questions at this time.   Jessica Stewart-- RAS SHANKS  Office Phone:  [phone]

## 2024-06-24 ENCOUNTER — OFFICE VISIT (OUTPATIENT)
Dept: UROLOGY | Facility: HOSPITAL | Age: 34
End: 2024-06-24
Payer: MEDICAID

## 2024-06-24 DIAGNOSIS — Z43.5 ENCOUNTER FOR CARE OR REPLACEMENT OF SUPRAPUBIC TUBE (MULTI): ICD-10-CM

## 2024-06-24 DIAGNOSIS — N31.9 NEUROGENIC BLADDER: Primary | ICD-10-CM

## 2024-06-24 PROCEDURE — 51703 INSERT BLADDER CATH COMPLEX: CPT | Performed by: NURSE PRACTITIONER

## 2024-06-24 PROCEDURE — 51705 CHANGE OF BLADDER TUBE: CPT | Performed by: NURSE PRACTITIONER

## 2024-06-24 PROCEDURE — 99213 OFFICE O/P EST LOW 20 MIN: CPT | Performed by: NURSE PRACTITIONER

## 2024-07-29 ENCOUNTER — APPOINTMENT (OUTPATIENT)
Dept: UROLOGY | Facility: CLINIC | Age: 34
End: 2024-07-29
Payer: MEDICAID

## 2024-07-29 DIAGNOSIS — N31.9 NEUROGENIC DYSFUNCTION OF THE URINARY BLADDER: ICD-10-CM

## 2024-07-29 DIAGNOSIS — Z93.50: ICD-10-CM

## 2024-07-29 DIAGNOSIS — N39.0 RECURRENT UTI: Primary | ICD-10-CM

## 2024-07-29 PROCEDURE — 99213 OFFICE O/P EST LOW 20 MIN: CPT | Performed by: NURSE PRACTITIONER

## 2024-07-29 PROCEDURE — 51705 CHANGE OF BLADDER TUBE: CPT | Performed by: NURSE PRACTITIONER

## 2024-07-29 PROCEDURE — 51703 INSERT BLADDER CATH COMPLEX: CPT | Performed by: NURSE PRACTITIONER

## 2024-07-29 PROCEDURE — 1036F TOBACCO NON-USER: CPT | Performed by: NURSE PRACTITIONER

## 2024-07-29 RX ORDER — CIPROFLOXACIN 500 MG/1
500 TABLET ORAL DAILY
Qty: 3 TABLET | Refills: 0 | Status: SHIPPED | OUTPATIENT
Start: 2024-07-29 | End: 2024-08-01

## 2024-07-29 ASSESSMENT — PAIN SCALES - GENERAL: PAINLEVEL: 0-NO PAIN

## 2024-07-29 NOTE — PROGRESS NOTES
[facility]  Outpatient Clinic Note    Patient Name:  Reinaldo Sotelo Jr.  MRN:  [id number]  :  1990  Date of Service: 2024     Visit type: Follow up visit    Last seen - 24 with Jessica Stewart CNP     Problem list/Chief complaints:  Neurogenic bladder and urethral stricture - suprapubic catheter (16f straight silicone)    HPI    Interval History:  Reinaldo Sotelo Jr. is a 34 y.o. male who is being seen today for scheduled suprapubic catheter exchage. He is accompanied by a caregiver. Patient is non verbal but using facial expression and gestures to communicate. He is taking cipro antibiotic as prescribed, denies gross hematuria, fever or chills.     Past Medical History:   Diagnosis Date    Personal history of other diseases of the circulatory system     History of hypertension    Personal history of other diseases of the respiratory system     Personal history of asthma    Personal history of other mental and behavioral disorders     History of mental retardation    Unspecified hydronephrosis 2021    Bilateral hydronephrosis       Past Surgical History:   Procedure Laterality Date    FOOT SURGERY  2014    Foot Surgery    HAND SURGERY  2014    Hand Surgery                                                                                                                                                          IR  BLADDER ASPIRATION  2016    IR  BLADDER ASPIRATION 2016 [facility] [place] [facility]    NECK SURGERY  2014    Neck Surgery       Social History     Socioeconomic History    Marital status: Single     Spouse name: Not on file    Number of children: Not on file    Years of education: Not on file    Highest education level: Not on file   Occupational History    Not on file   Tobacco Use    Smoking status: Never    Smokeless tobacco: Never   Substance and Sexual Activity    Alcohol use: Never    Drug use: Never    Sexual activity: Not on file   Other Topics  Concern    Not on file   Social History Narrative    Not on file     Social Determinants of Health     Financial Resource Strain: Not on file   Food Insecurity: Not on file   Transportation Needs: Not on file   Physical Activity: Not on file   Stress: Not on file   Social Connections: Not on file   Intimate Partner Violence: Not on file   Housing Stability: Not on file       Allergies   Allergen Reactions    Other Rash and Other    Pollen Extracts Other    Latex Unknown    Sulfamethoxazole-Trimethoprim Unknown    Cat's Claw Rash          Current Outpatient Medications:     Chest Congestion Relief DM  mg/5 mL oral liquid, TAKE 10 ML BY MOUTH EVERY 4 HOURS AS NEEDED FOR COUGH, Disp: 120 mL, Rfl: 11    fluticasone (Flonase) 50 mcg/actuation nasal spray, INHALE 1 SPRAY IN EACH NOSTRIL EVERY MORNING AS NEEDED FOR NASAL CONGESTION, Disp: 16 g, Rfl: 11    acetaminophen (Tylenol) 325 mg tablet, TAKE 2 TABLETS (650MG) BY MOUTH EVERY 4 HOURS AS NEEDED FOR FEVER GREATER THAN 99, Disp: 60 tablet, Rfl: 11    amLODIPine (Norvasc) 10 mg tablet, TAKE 1 TABLET BY MOUTH ONCE EVERY DAY FOR HYPERTENSION (8AM), Disp: 31 tablet, Rfl: 11    atenolol (Tenormin) 25 mg tablet, TAKE 1 TABLET BY MOUTH ONCE EVERY DAY FOR HYPERTENSION (8AM), Disp: 31 tablet, Rfl: 11    ciprofloxacin (Cipro) 500 mg tablet, TAKE 1 TABLET BY MOUTH THE DAY BEFORE, DAY OF, AND DAY FOLLOWING CATHETER EXCHANGE, Disp: 10 tablet, Rfl: 0    docusate sodium (Colace) 100 mg capsule, TAKE 1 CAPSULE BY MOUTH ONCE EVERY DAY FOR CONSTIPATION (8PM), Disp: 31 capsule, Rfl: 11    hydroCHLOROthiazide (HYDRODiuril) 25 mg tablet, TAKE 1 TABLET BY MOUTH ONCE EVERY DAY FOR DIURETIC (8AM), Disp: 31 tablet, Rfl: 11    levETIRAcetam (Keppra) 250 mg tablet, 250 mg in am and 375 in pm Orally for 30 day(s), Disp: , Rfl:     levocetirizine (Xyzal) 5 mg tablet, Take 1 tablet (5 mg) by mouth once daily in the evening., Disp: , Rfl:     loperamide (Imodium) 2 mg capsule, TAKE 2 CAPSULES BY  MOUTH STAT THEN 1 CAPSULE AFTER EACH LOOSE STOOL THERAFTER, Disp: 14 capsule, Rfl: 11    loratadine (Claritin) 10 mg tablet, TAKE 1 TABLET BY MOUTH ONCE EVERY DAY FOR ALLERGIES (8AM), Disp: 31 tablet, Rfl: 11    neomycn/bacitrc/polymyx/pramox (TRIPLE ANTIBIOTIC PLUS TOP), APPLY TOPICALLY TWICE DAILY AS NEEDED FOR MINOR CUTS, ABRASIONS AND SKIN IRRITATIONS, Disp: , Rfl:     NON FORMULARY, Theraderm lotion, apply to skin daily, Disp: , Rfl:     nystatin (Mycostatin) cream, APPLY TOPICALLY TO AFFECTED AREA(S) OF GROIN 3 TIMES DAILY AS NEEDED, Disp: 30 g, Rfl: 11    oxybutynin (Ditropan) 5 mg tablet, TAKE 1 TABLET BY MOUTH THREE TIMES DAILY FOR URINARY DISCOMFORT (8AM,4PM,8PM), Disp: 93 tablet, Rfl: 11    sertraline (Zoloft) 25 mg tablet, TAKE 1 TABLET BY MOUTH THREE TIMES DAILY FOR OCD (8AM,4PM,8PM), Disp: 93 tablet, Rfl: 11    Thera-Derm lotion, APPLY TOPICALLY TO AFFECTED AREA(S) OF DRY SKIN EVERY DAY, Disp: 236 mL, Rfl: 11     Review of system:  All other systems have been reviewed and are negative for complaints      Last recorded vitals:  There were no vitals taken for this visit.    Physical Exam:  General: Appears comfortable and in no apparent distress  Head: atraumatic  Eyes: Non-injected conjunctiva, sclera clear, no proptosis  Lungs: Breathing is easy, non-labored. Normal diaphragmatic movement.  Cardiovascular: no peripheral edema, cyanosis or pallor.   Abdomen: rounded, non-tender.   : Bladder stoma with minimal drainage noted  MSK: wheel chair dependent, able to self transfer  Skin: No visible rashes or lesions      Labs  Lab Results   Component Value Date    WBC 6.9 05/12/2023    HGB 13.4 (L) 05/12/2023    HCT 44.1 05/12/2023    MCV 77 (L) 05/12/2023     05/12/2023     Lab Results   Component Value Date    GLUCOSE 83 05/12/2023    CALCIUM 9.8 05/12/2023     05/12/2023    K 3.4 (L) 05/12/2023    CO2 29 05/12/2023     05/12/2023    BUN 40 (H) 05/12/2023    CREATININE 1.73 (H)  05/12/2023         Assessment and Plan:  Reinaldo Sotelo Jr. is a 34 y.o. male with history of neurogenic bladder and urethral stricture, now with suprapubic catheter. Patient presents for scheduled suprapubic catheter exchange.     Current indwelling catheter, 16 fr, was removed without difficulty.  Patient was prepped in sterile fashion and 16 fr silicone catheter was inserted with sterile technique without complication. There was return of clear yellow urine. Patient tolerated well.     Plan:  -Patient to complete antibiotics as prescribed  -Will plan for 3 day antibiotic for empiric antibiotic coverage with next cath exchange. Patient is tolerating antibiotic well with no side effects.   -Prescription for Ciprofloxacin 500 mg daily x3 days sent to pharmacy    Follow-up 4 weeks for catheter exchange, or sooner if needed, to reassess symptoms and for medication refill.    All questions and concerns were addressed. Patient verbalizes understanding and has no other questions at this time.     Some elements copied from Jessica Stewart's, CNP note on 6/24/24, the elements have been updated and all reflect current decision making from today, 07/29/24    E&M visit today is associated with current or anticipated ongoing medical care services related to a patient's single, serious condition or a complex condition.    RIMMA Lua-CNP   [facility]  07/29/24 10:11 AM

## 2024-08-26 ENCOUNTER — OFFICE VISIT (OUTPATIENT)
Dept: UROLOGY | Facility: CLINIC | Age: 34
End: 2024-08-26
Payer: MEDICAID

## 2024-08-26 DIAGNOSIS — N31.9 NEUROGENIC DYSFUNCTION OF THE URINARY BLADDER: ICD-10-CM

## 2024-08-26 DIAGNOSIS — Z93.59 PRESENCE OF SUPRAPUBIC CATHETER (MULTI): Primary | ICD-10-CM

## 2024-08-26 PROCEDURE — 51705 CHANGE OF BLADDER TUBE: CPT | Performed by: NURSE PRACTITIONER

## 2024-08-26 PROCEDURE — 99213 OFFICE O/P EST LOW 20 MIN: CPT | Performed by: NURSE PRACTITIONER

## 2024-08-26 NOTE — PROGRESS NOTES
Subjective   Patient ID: Eric Sotelo Jr. is a 34 y.o. male presenting for SPT exchange (16F straight silicone)    HPI  34 -year-old gentleman with history of urethral stricture and neurogenic bladder now with SPT presents for SPT exchange. No new complaints. He has not had any gross hematuria, fevers or chills. He is accompanied by caregiver.      Review of Systems  All other systems have been reviewed and are negative for complaint.    Objective   Physical Exam  General: Well developed, well nourished, alert and cooperative, appears in no acute distress  Eyes: Non-injected conjunctiva, sclera clear, no proptosis  Cardiac: Extremities are warm and well perfused. No edema, cyanosis or pallor.   Lungs: Breathing is easy, non-labored. Speaking in clear and complete sentences. Normal diaphragmatic movement.  MSK: Ambulatory with steady gait, unassisted  Neuro: alert and oriented to person, place and time  Psych: Demonstrates good judgement and reason, without hallucinations, abnormal affect or abnormal behaviors.  Skin: no obvious lesions, no rashes.    Assessment/Plan   There are no diagnoses linked to this encounter.      33 -year-old gentleman with history of urethral stricture and neurogenic bladder now with SPT presents for SPT exchange. No new complaints. He has not had any gross hematuria, fevers or chills. He is accompanied by caregiver.     Has stable right hydronephrosis that needs intermittent imaging (last ultrasound in May 2021) Follows with nephrology, no recent imaging.     Bladder Catheterization     Procedure Details    Procedure: catheter insertion      Catheter insertion: suprapubic tube      Catheter size: 16 Fr Silicone    Complexity: complex      16 straight silicone catheter exchanged with return of clear, yellow urine. Irrigated with return of clear, yellow urine.     Will plan on 3 day abx for empiric antibiotic coverage.      Brianna Pérez - [phone]; best contact for results     Will  continue with monthly changes.      All questions and concerns were addressed. Patient verbalizes understanding and has no other questions at this time.   Jessica Stewart-- RAS SHANKS  Office Phone:  [phone]

## 2024-09-23 ENCOUNTER — OFFICE VISIT (OUTPATIENT)
Dept: UROLOGY | Facility: CLINIC | Age: 34
End: 2024-09-23
Payer: MEDICAID

## 2024-09-23 DIAGNOSIS — Z93.59 PRESENCE OF SUPRAPUBIC CATHETER (MULTI): Primary | ICD-10-CM

## 2024-09-23 DIAGNOSIS — N31.9 NEUROGENIC DYSFUNCTION OF THE URINARY BLADDER: ICD-10-CM

## 2024-09-23 PROCEDURE — 51705 CHANGE OF BLADDER TUBE: CPT | Performed by: NURSE PRACTITIONER

## 2024-09-23 PROCEDURE — 99213 OFFICE O/P EST LOW 20 MIN: CPT | Performed by: NURSE PRACTITIONER

## 2024-09-23 NOTE — PROGRESS NOTES
Subjective   Patient ID: Eric Sotelo Jr. is a 34 y.o. male presenting for SPT exchange (16F straight silicone)    HPI  34 -year-old gentleman with history of urethral stricture and neurogenic bladder now with SPT presents for SPT exchange. No new complaints. He has not had any gross hematuria, fevers or chills. He is accompanied by caregiver.      Review of Systems  All other systems have been reviewed and are negative for complaint.    Objective   Physical Exam  General: Well developed, well nourished, alert and cooperative, appears in no acute distress  Eyes: Non-injected conjunctiva, sclera clear, no proptosis  Cardiac: Extremities are warm and well perfused. No edema, cyanosis or pallor.   Lungs: Breathing is easy, non-labored. Speaking in clear and complete sentences. Normal diaphragmatic movement.  MSK: Ambulatory with steady gait, unassisted  Neuro: alert and oriented to person, place and time  Psych: Demonstrates good judgement and reason, without hallucinations, abnormal affect or abnormal behaviors.  Skin: no obvious lesions, no rashes.    Assessment/Plan   There are no diagnoses linked to this encounter.      33 -year-old gentleman with history of urethral stricture and neurogenic bladder now with SPT presents for SPT exchange. No new complaints. He has not had any gross hematuria, fevers or chills. He is accompanied by caregiver.     Has stable right hydronephrosis that needs intermittent imaging (last ultrasound in May 2021) Follows with nephrology, no recent imaging.     Bladder Catheterization     Procedure Details    Procedure: catheter insertion      Catheter insertion: suprapubic tube      Catheter size: 16 Fr Silicone    Complexity: complex      16 straight silicone catheter exchanged with return of clear, yellow urine. Irrigated with return of clear, yellow urine.     Will plan on 3 day abx for empiric antibiotic coverage.      Brianna Pérez - [phone]; best contact for results     Will  continue with monthly changes.      All questions and concerns were addressed. Patient verbalizes understanding and has no other questions at this time.   Jessica Stewart-- RAS SHANKS  Office Phone:  [phone]

## 2024-10-28 ENCOUNTER — APPOINTMENT (OUTPATIENT)
Dept: UROLOGY | Facility: HOSPITAL | Age: 34
End: 2024-10-28
Payer: MEDICAID

## 2024-10-30 ENCOUNTER — APPOINTMENT (OUTPATIENT)
Dept: UROLOGY | Facility: CLINIC | Age: 34
End: 2024-10-30
Payer: MEDICAID

## 2024-12-03 ENCOUNTER — APPOINTMENT (OUTPATIENT)
Dept: UROLOGY | Facility: CLINIC | Age: 34
End: 2024-12-03
Payer: MEDICAID

## 2024-12-10 NOTE — PROGRESS NOTES
[facility]  Outpatient Clinic Note    Patient Name:  Reinaldo Sotelo Jr.  MRN:  [id number]  :  1990  Date of Service: 2024     Visit type: Follow up visit    Last seen - 24 with Jessica Stewart CNP     Problem list/Chief complaints:  Neurogenic bladder and urethral stricture - suprapubic catheter (16f straight silicone)    HPI    Interval History:  24: Reinaldo Sotelo Jr. is a 34 y.o. male who is being seen today for scheduled suprapubic catheter exchage. He is accompanied by a caregiver. Patient is non verbal but using facial expression and gestures to communicate. He is taking cipro antibiotic as prescribed, denies gross hematuria, fever or chills.     24: 33 -year-old gentleman with history of urethral stricture and neurogenic bladder now with SPT presents for SPT exchange. No new complaints. He has not had any gross hematuria, fevers or chills. He is accompanied by caregiver.  Has stable right hydronephrosis that needs intermittent imaging (last ultrasound in May 2021) Follows with nephrology, no recent imaging.   16 straight silicone catheter exchanged with return of clear, yellow urine. Irrigated with return of clear, yellow urine. Will plan on 3 day abx for empiric antibiotic coverage. Brianna Pérez - [phone]; best contact for results. Will continue with monthly changes.    24: Eric Sotelo Jr. is a 34 y.o. male presenting for SPT exchange (16F straight silicone). 16 straight silicone catheter exchanged with return of clear, yellow urine. Irrigated with return of clear, yellow urine. Will plan on 3 day abx for empiric antibiotic coverage.    24: Patient presents for SPT exchange. He is accompanied by his caregiver. Catheter exchanged with clear, blood tinged urine.     Past Medical History:   Diagnosis Date    Personal history of other diseases of the circulatory system     History of hypertension    Personal history of other diseases of the respiratory system      Personal history of asthma    Personal history of other mental and behavioral disorders     History of mental retardation    Unspecified hydronephrosis 05/05/2021    Bilateral hydronephrosis       Past Surgical History:   Procedure Laterality Date    FOOT SURGERY  01/09/2014    Foot Surgery    HAND SURGERY  01/09/2014    Hand Surgery                                                                                                                                                          IR  BLADDER ASPIRATION  11/2/2016    IR  BLADDER ASPIRATION 11/2/2016 [facility] [place] [facility]    NECK SURGERY  01/09/2014    Neck Surgery       Social History     Socioeconomic History    Marital status: Single     Spouse name: Not on file    Number of children: Not on file    Years of education: Not on file    Highest education level: Not on file   Occupational History    Not on file   Tobacco Use    Smoking status: Never    Smokeless tobacco: Never   Substance and Sexual Activity    Alcohol use: Never    Drug use: Never    Sexual activity: Not on file   Other Topics Concern    Not on file   Social History Narrative    Not on file     Social Drivers of Health     Financial Resource Strain: Not on file   Food Insecurity: Not on file   Transportation Needs: Not on file   Physical Activity: Not on file   Stress: Not on file   Social Connections: Not on file   Intimate Partner Violence: Not on file   Housing Stability: Not on file       Allergies   Allergen Reactions    Other Rash and Other    Pollen Extracts Other    Latex Unknown    Sulfamethoxazole-Trimethoprim Unknown    Cat's Claw Rash          Current Outpatient Medications:     Chest Congestion Relief DM  mg/5 mL oral liquid, TAKE 10 ML BY MOUTH EVERY 4 HOURS AS NEEDED FOR COUGH, Disp: 120 mL, Rfl: 11    fluticasone (Flonase) 50 mcg/actuation nasal spray, INHALE 1 SPRAY IN EACH NOSTRIL EVERY MORNING AS NEEDED FOR NASAL CONGESTION, Disp: 16 g, Rfl: 11    acetaminophen  (Tylenol) 325 mg tablet, TAKE 2 TABLETS (650MG) BY MOUTH EVERY 4 HOURS AS NEEDED FOR FEVER GREATER THAN 99, Disp: 60 tablet, Rfl: 11    amLODIPine (Norvasc) 10 mg tablet, TAKE 1 TABLET BY MOUTH ONCE EVERY DAY FOR HYPERTENSION (8AM), Disp: 31 tablet, Rfl: 11    atenolol (Tenormin) 25 mg tablet, TAKE 1 TABLET BY MOUTH ONCE EVERY DAY FOR HYPERTENSION (8AM), Disp: 31 tablet, Rfl: 11    docusate sodium (Colace) 100 mg capsule, TAKE 1 CAPSULE BY MOUTH ONCE EVERY DAY FOR CONSTIPATION (8PM), Disp: 31 capsule, Rfl: 11    hydroCHLOROthiazide (HYDRODiuril) 25 mg tablet, TAKE 1 TABLET BY MOUTH ONCE EVERY DAY FOR DIURETIC (8AM), Disp: 31 tablet, Rfl: 11    levETIRAcetam (Keppra) 250 mg tablet, 250 mg in am and 375 in pm Orally for 30 day(s), Disp: , Rfl:     levocetirizine (Xyzal) 5 mg tablet, Take 1 tablet (5 mg) by mouth once daily in the evening., Disp: , Rfl:     loperamide (Imodium) 2 mg capsule, TAKE 2 CAPSULES BY MOUTH STAT THEN 1 CAPSULE AFTER EACH LOOSE STOOL THERAFTER, Disp: 14 capsule, Rfl: 11    loratadine (Claritin) 10 mg tablet, TAKE 1 TABLET BY MOUTH ONCE EVERY DAY FOR ALLERGIES (8AM), Disp: 31 tablet, Rfl: 11    neomycn/bacitrc/polymyx/pramox (TRIPLE ANTIBIOTIC PLUS TOP), APPLY TOPICALLY TWICE DAILY AS NEEDED FOR MINOR CUTS, ABRASIONS AND SKIN IRRITATIONS, Disp: , Rfl:     NON FORMULARY, Theraderm lotion, apply to skin daily, Disp: , Rfl:     nystatin (Mycostatin) cream, APPLY TOPICALLY TO AFFECTED AREA(S) OF GROIN 3 TIMES DAILY AS NEEDED, Disp: 30 g, Rfl: 11    oxybutynin (Ditropan) 5 mg tablet, TAKE 1 TABLET BY MOUTH THREE TIMES DAILY FOR URINARY DISCOMFORT (8AM,4PM,8PM), Disp: 93 tablet, Rfl: 11    sertraline (Zoloft) 25 mg tablet, TAKE 1 TABLET BY MOUTH THREE TIMES DAILY FOR OCD (8AM,4PM,8PM), Disp: 93 tablet, Rfl: 11    Thera-Derm lotion, APPLY TOPICALLY TO AFFECTED AREA(S) OF DRY SKIN EVERY DAY, Disp: 236 mL, Rfl: 11     Review of system:  All other systems have been reviewed and are negative for  complaints      Last recorded vitals:  There were no vitals taken for this visit.    Physical Exam:  General: Appears comfortable and in no apparent distress  Head: atraumatic  Eyes: Non-injected conjunctiva, sclera clear, no proptosis  Lungs: Breathing is easy, non-labored. Normal diaphragmatic movement.  Cardiovascular: no peripheral edema, cyanosis or pallor.   Abdomen: rounded, non-tender.   : Bladder stoma with minimal drainage noted  MSK: wheel chair dependent, able to self transfer  Skin: No visible rashes or lesions      Labs  Lab Results   Component Value Date    WBC 6.9 05/12/2023    HGB 13.4 (L) 05/12/2023    HCT 44.1 05/12/2023    MCV 77 (L) 05/12/2023     05/12/2023     Lab Results   Component Value Date    GLUCOSE 83 05/12/2023    CALCIUM 9.8 05/12/2023     05/12/2023    K 3.4 (L) 05/12/2023    CO2 29 05/12/2023     05/12/2023    BUN 40 (H) 05/12/2023    CREATININE 1.73 (H) 05/12/2023         Assessment and Plan:  Reinaldo Sotelo Jr. is a 34 y.o. male with history of neurogenic bladder and urethral stricture, now with suprapubic catheter. Patient presents for scheduled suprapubic catheter exchange.     Bladder Catheterization  Current indwelling catheter, 16 fr, was removed without difficulty.  Prep: patient was prepped and draped in usual sterile fashion    Prep type: betadine       Procedure Details    Procedure: catheter change      Catheter insertion: SPT    Catheter size: 16 fr straight    Catheter provided by: health care organization      Number of initial attempts: 1    Urine characteristics: clear, blood tinged    Balloon inflation amount (mL) comment: 10 ml    Leg bag attached: No, overnight bag attached      Post-Procedure Details     Outcome: patient tolerated procedure well with no complications      Post-procedure interventions: post-procedure education provided      Disposition: discharged home in satisfactory condition        Current indwelling catheter, 16 fr, was  removed with some difficulty.  Patient was prepped in sterile fashion and 16 fr straight catheter was inserted with sterile technique without complication. There was return of clear yellow urine. Patient tolerated well.     Plan:  -Patient takes antibiotics one day before cath exchange, day or cath exchange and day after exchange. Confirmed with patient's caregiver.  -Will plan for 3 day antibiotic for empiric antibiotic coverage with next cath exchange. Patient is tolerating antibiotic well with no side effects.   -Follow-up 4 weeks for catheter exchange, or sooner if needed, to reassess symptoms and for medication refill.    All questions and concerns were addressed. Patient verbalizes understanding and has no other questions at this time.     Some elements copied from Jessica Stewart's, CNP note on 9/23/24, the elements have been updated and all reflect current decision making from today, 12/11/24    E&M visit today is associated with current or anticipated ongoing medical care services related to a patient's single, serious condition or a complex condition.    RIMMA Lua-CNP   [facility]  12/11/24 11:26 AM

## 2024-12-11 ENCOUNTER — APPOINTMENT (OUTPATIENT)
Dept: UROLOGY | Facility: HOSPITAL | Age: 34
End: 2024-12-11
Payer: MEDICAID

## 2024-12-11 DIAGNOSIS — Z97.8 FOLEY CATHETER IN PLACE: ICD-10-CM

## 2024-12-11 DIAGNOSIS — N31.9 NEUROGENIC DYSFUNCTION OF THE URINARY BLADDER: Primary | ICD-10-CM

## 2024-12-11 PROCEDURE — 51705 CHANGE OF BLADDER TUBE: CPT | Performed by: NURSE PRACTITIONER

## 2024-12-11 PROCEDURE — 1036F TOBACCO NON-USER: CPT | Performed by: NURSE PRACTITIONER

## 2024-12-11 PROCEDURE — 51703 INSERT BLADDER CATH COMPLEX: CPT | Performed by: NURSE PRACTITIONER

## 2024-12-11 PROCEDURE — 99213 OFFICE O/P EST LOW 20 MIN: CPT | Performed by: NURSE PRACTITIONER

## 2024-12-11 RX ORDER — CIPROFLOXACIN 500 MG/1
500 TABLET ORAL 2 TIMES DAILY
Qty: 6 TABLET | Refills: 0 | Status: SHIPPED | OUTPATIENT
Start: 2024-12-11 | End: 2024-12-11 | Stop reason: ENTERED-IN-ERROR

## 2025-01-08 ENCOUNTER — APPOINTMENT (OUTPATIENT)
Dept: UROLOGY | Facility: CLINIC | Age: 35
End: 2025-01-08
Payer: MEDICAID

## 2025-01-08 VITALS — TEMPERATURE: 97.1 F

## 2025-01-08 DIAGNOSIS — N31.9 NEUROGENIC BLADDER: Primary | ICD-10-CM

## 2025-01-08 DIAGNOSIS — Z43.5 ENCOUNTER FOR CARE OR REPLACEMENT OF SUPRAPUBIC TUBE: ICD-10-CM

## 2025-01-08 PROCEDURE — 99213 OFFICE O/P EST LOW 20 MIN: CPT | Performed by: NURSE PRACTITIONER

## 2025-01-08 PROCEDURE — 1036F TOBACCO NON-USER: CPT | Performed by: NURSE PRACTITIONER

## 2025-01-08 PROCEDURE — 51705 CHANGE OF BLADDER TUBE: CPT | Performed by: NURSE PRACTITIONER

## 2025-01-08 NOTE — PROGRESS NOTES
Subjective   Patient ID: Eric Sotelo Jr. is a 34 y.o. male     HPI  34 -year-old gentleman with history of urethral stricture and neurogenic bladder now with SPT presents for SPT exchange (16 F Latex)  No new complaints. He has not had any gross hematuria, fevers or chills. He is accompanied by caregiver.      Review of Systems  All other systems have been reviewed and are negative for complaint.    Objective   Physical Exam  General: Well developed, well nourished, alert and cooperative, appears in no acute distress  Eyes: Non-injected conjunctiva, sclera clear, no proptosis  Cardiac: Extremities are warm and well perfused. No edema, cyanosis or pallor.   Lungs: Breathing is easy, non-labored. Speaking in clear and complete sentences. Normal diaphragmatic movement.  MSK: Ambulatory with steady gait, unassisted  Neuro: alert and oriented to person, place and time  Psych: Demonstrates good judgement and reason, without hallucinations, abnormal affect or abnormal behaviors.  Skin: no obvious lesions, no rashes.    Assessment/Plan   There are no diagnoses linked to this encounter.      33 -year-old gentleman with history of urethral stricture and neurogenic bladder now with SPT presents for SPT exchange. No new complaints. He has not had any gross hematuria, fevers or chills. He is accompanied by caregiver.     Has stable right hydronephrosis that needs intermittent imaging (last ultrasound in May 2021) Follows with nephrology, no recent imaging.     Bladder Catheterization     Procedure Details    Procedure: catheter insertion      Catheter insertion: suprapubic tube      Catheter size: 16 Fr Silicone    Complexity: complex      16 straight silicone catheter exchanged with return of clear, yellow urine. Irrigated with return of clear, yellow urine.     Will plan on 3 day abx for empiric antibiotic coverage.      Brianna Pérez - [phone]; best contact for results     Will continue with monthly changes.      All  questions and concerns were addressed. Patient verbalizes understanding and has no other questions at this time.   Jessica Stewart-- RAS SHANKS  Office Phone:  [phone]

## 2025-02-10 NOTE — PROGRESS NOTES
Subjective   Patient ID: Eric Sotelo Jr. is a 34 y.o. male     HPI  34 -year-old gentleman with history of urethral stricture and neurogenic bladder now with SPT presents for SPT exchange (16 F straight Latex)  No new complaints. He has not had any gross hematuria, fevers or chills. He is accompanied by caregiver.      Review of Systems  All other systems have been reviewed and are negative for complaint.    Objective   Physical Exam  General: Well developed, well nourished, alert and cooperative, appears in no acute distress  Eyes: Non-injected conjunctiva, sclera clear, no proptosis  Cardiac: Extremities are warm and well perfused. No edema, cyanosis or pallor.   Lungs: Breathing is easy, non-labored. Speaking in clear and complete sentences. Normal diaphragmatic movement.  MSK: Ambulatory with steady gait, unassisted  Neuro: alert and oriented to person, place and time  Psych: Demonstrates good judgement and reason, without hallucinations, abnormal affect or abnormal behaviors.  Skin: no obvious lesions, no rashes.    Assessment/Plan   There are no diagnoses linked to this encounter.      34 -year-old gentleman with history of urethral stricture and neurogenic bladder now with SPT presents for SPT exchange. No new complaints. He has not had any gross hematuria, fevers or chills. He is accompanied by caregiver.     Has stable right hydronephrosis that needs intermittent imaging (last ultrasound in May 2021) Follows with nephrology, no recent imaging.     Bladder Catheterization     Procedure Details    Procedure: catheter insertion      Catheter insertion: suprapubic tube      Catheter size: 16 Fr straight latex     Complexity: complex      16 straight latex catheter exchanged with return of clear, yellow urine. Irrigated with return of clear, yellow urine.      Brianna Pérez - [phone]; best contact for results     Will continue with monthly changes.      All questions and concerns were addressed. Patient  verbalizes understanding and has no other questions at this time.     Jessica Stewart-- RAS SHANKS  Office Phone:  [phone]

## 2025-02-11 ENCOUNTER — APPOINTMENT (OUTPATIENT)
Dept: UROLOGY | Facility: CLINIC | Age: 35
End: 2025-02-11
Payer: MEDICAID

## 2025-02-11 VITALS — TEMPERATURE: 96.5 F

## 2025-02-11 DIAGNOSIS — N31.9 NEUROGENIC DYSFUNCTION OF THE URINARY BLADDER: Primary | ICD-10-CM

## 2025-02-11 DIAGNOSIS — Z43.5 ENCOUNTER FOR CARE OR REPLACEMENT OF SUPRAPUBIC TUBE: ICD-10-CM

## 2025-02-11 PROCEDURE — 51705 CHANGE OF BLADDER TUBE: CPT | Performed by: NURSE PRACTITIONER

## 2025-02-11 PROCEDURE — 1036F TOBACCO NON-USER: CPT | Performed by: NURSE PRACTITIONER

## 2025-02-11 PROCEDURE — 99213 OFFICE O/P EST LOW 20 MIN: CPT | Performed by: NURSE PRACTITIONER

## 2025-03-10 NOTE — PROGRESS NOTES
Subjective   Patient ID: Eric Sotelo Jr. is a 34 y.o. male     HPI  34 -year-old gentleman with history of urethral stricture and neurogenic bladder now with SPT presents for SPT exchange (16 F straight Latex)  No new complaints. He has not had any gross hematuria, fevers or chills. He is accompanied by caregiver.      Review of Systems  All other systems have been reviewed and are negative for complaint.    Objective   Physical Exam  General: Well developed, well nourished, alert and cooperative, appears in no acute distress  Eyes: Non-injected conjunctiva, sclera clear, no proptosis  Cardiac: Extremities are warm and well perfused. No edema, cyanosis or pallor.   Lungs: Breathing is easy, non-labored. Speaking in clear and complete sentences. Normal diaphragmatic movement.  MSK: Ambulatory with steady gait, unassisted  Neuro: alert and oriented to person, place and time  Psych: Demonstrates good judgement and reason, without hallucinations, abnormal affect or abnormal behaviors.  Skin: no obvious lesions, no rashes.    Assessment/Plan   There are no diagnoses linked to this encounter.      34 -year-old gentleman with history of urethral stricture and neurogenic bladder now with SPT presents for SPT exchange. No new complaints. He has not had any gross hematuria, fevers or chills. He is accompanied by caregiver.     Has stable right hydronephrosis that needs intermittent imaging (last ultrasound in May 2021) Follows with nephrology, no recent imaging.     Bladder Catheterization     Procedure Details    Procedure: catheter insertion      Catheter insertion: suprapubic tube      Catheter size: 16 Fr straight latex     Complexity: complex      16 straight latex catheter exchanged with return of clear, yellow urine. Irrigated with return of clear, yellow urine.      Brianna Pérez - [phone]; best contact for results     Will continue with monthly changes.      All questions and concerns were addressed. Patient  verbalizes understanding and has no other questions at this time.     Jessica Stewart-- RAS SHANKS  Office Phone:  [phone]

## 2025-03-11 ENCOUNTER — APPOINTMENT (OUTPATIENT)
Dept: UROLOGY | Facility: CLINIC | Age: 35
End: 2025-03-11
Payer: MEDICAID

## 2025-03-11 DIAGNOSIS — N31.9 NEUROGENIC DYSFUNCTION OF THE URINARY BLADDER: Primary | ICD-10-CM

## 2025-03-11 DIAGNOSIS — Z43.5 ENCOUNTER FOR CARE OR REPLACEMENT OF SUPRAPUBIC TUBE: ICD-10-CM

## 2025-03-11 PROCEDURE — 51710 CHANGE OF BLADDER TUBE: CPT | Performed by: NURSE PRACTITIONER

## 2025-03-11 PROCEDURE — 99213 OFFICE O/P EST LOW 20 MIN: CPT | Performed by: NURSE PRACTITIONER

## 2025-03-11 PROCEDURE — 1036F TOBACCO NON-USER: CPT | Performed by: NURSE PRACTITIONER

## 2025-04-08 ENCOUNTER — APPOINTMENT (OUTPATIENT)
Dept: UROLOGY | Facility: CLINIC | Age: 35
End: 2025-04-08
Payer: MEDICAID

## 2025-05-03 ENCOUNTER — TELEPHONE (OUTPATIENT)
Dept: HOME HEALTH SERVICES | Facility: HOME HEALTH | Age: 35
End: 2025-05-03
Payer: MEDICAID

## 2025-05-03 ENCOUNTER — DOCUMENTATION (OUTPATIENT)
Dept: UROLOGY | Facility: CLINIC | Age: 35
End: 2025-05-03
Payer: MEDICAID

## 2025-05-03 DIAGNOSIS — N31.9 NEUROGENIC DYSFUNCTION OF THE URINARY BLADDER: Primary | ICD-10-CM

## 2025-05-03 DIAGNOSIS — Z93.59 PRESENCE OF SUPRAPUBIC CATHETER (MULTI): ICD-10-CM

## 2025-05-03 NOTE — TELEPHONE ENCOUNTER
Good afternoon,  Home Care received a referral for this patient. Does this patient have all the needed catheter supplies in the home? Also do you want the catheter changed monthly?    We will await updates, Thank you.   [facility]ake

## 2025-05-03 NOTE — PROGRESS NOTES
Caretaker left message requesting Referral to Home Care for Suprapubic Catheter exchange.   Reinaldo is nonverbal. They are unable to transport patient to office. Referral placed

## 2025-05-04 ENCOUNTER — HOME HEALTH ADMISSION (OUTPATIENT)
Dept: HOME HEALTH SERVICES | Facility: HOME HEALTH | Age: 35
End: 2025-05-04
Payer: MEDICAID

## 2025-05-04 ENCOUNTER — DOCUMENTATION (OUTPATIENT)
Dept: HOME HEALTH SERVICES | Facility: HOME HEALTH | Age: 35
End: 2025-05-04
Payer: MEDICAID

## 2025-05-04 NOTE — HH CARE COORDINATION
Home Care received a Referral for Nursing. We have processed the referral for a Start of Care on 5/5-5/6.     If you have any questions or concerns, please feel free to contact us at [phone]. Follow the prompts, enter your five digit zip code, and you will be directed to your care team on [facility].

## 2025-05-06 ENCOUNTER — APPOINTMENT (OUTPATIENT)
Dept: UROLOGY | Facility: CLINIC | Age: 35
End: 2025-05-06
Payer: MEDICAID

## 2025-05-07 ENCOUNTER — HOME CARE VISIT (OUTPATIENT)
Dept: HOME HEALTH SERVICES | Facility: HOME HEALTH | Age: 35
End: 2025-05-07
Payer: MEDICAID

## 2025-05-07 VITALS
OXYGEN SATURATION: 99 % | SYSTOLIC BLOOD PRESSURE: 122 MMHG | DIASTOLIC BLOOD PRESSURE: 70 MMHG | HEART RATE: 77 BPM | RESPIRATION RATE: 18 BRPM | TEMPERATURE: 97.3 F

## 2025-05-07 PROCEDURE — G0299 HHS/HOSPICE OF RN EA 15 MIN: HCPCS

## 2025-05-07 ASSESSMENT — ACTIVITIES OF DAILY LIVING (ADL)
AMBULATION ASSISTANCE: NON-AMBULATORY
ENTERING_EXITING_HOME: TOTAL DEPENDENCE
CURRENT_FUNCTION: ONE PERSON
OASIS_M1830: 03

## 2025-05-07 ASSESSMENT — PAIN SCALES - PAIN ASSESSMENT IN ADVANCED DEMENTIA (PAINAD)
BODYLANGUAGE: 0
NEGVOCALIZATION: 0 - NONE.
CONSOLABILITY: 0
TOTALSCORE: 0
FACIALEXPRESSION: 0 - SMILING OR INEXPRESSIVE.
BREATHING: 0
BODYLANGUAGE: 0 - RELAXED.
FACIALEXPRESSION: 0
CONSOLABILITY: 0 - NO NEED TO CONSOLE.
NEGVOCALIZATION: 0

## 2025-05-07 ASSESSMENT — ENCOUNTER SYMPTOMS: APPETITE LEVEL: GOOD

## 2025-05-09 ENCOUNTER — APPOINTMENT (OUTPATIENT)
Dept: PRIMARY CARE | Facility: CLINIC | Age: 35
End: 2025-05-09
Payer: MEDICAID

## 2025-05-09 PROBLEM — T17.308A CHOKING: Status: RESOLVED | Noted: 2021-02-25 | Resolved: 2025-05-09

## 2025-05-09 PROBLEM — R82.90 ABNORMAL URINE SEDIMENT: Status: RESOLVED | Noted: 2018-10-30 | Resolved: 2025-05-09

## 2025-05-09 PROBLEM — L03.90 CELLULITIS: Status: RESOLVED | Noted: 2018-09-11 | Resolved: 2025-05-09

## 2025-05-09 PROBLEM — L03.119 CELLULITIS OF LOWER LEG: Status: RESOLVED | Noted: 2023-09-28 | Resolved: 2025-05-09

## 2025-05-09 PROBLEM — N12 PYELONEPHRITIS: Status: RESOLVED | Noted: 2021-07-23 | Resolved: 2025-05-09

## 2025-05-09 PROBLEM — A41.9 SEPSIS (MULTI): Status: RESOLVED | Noted: 2023-09-28 | Resolved: 2025-05-09

## 2025-05-13 DIAGNOSIS — F32.A DEPRESSIVE DISORDER: ICD-10-CM

## 2025-05-13 DIAGNOSIS — I10 BENIGN ESSENTIAL HYPERTENSION: ICD-10-CM

## 2025-05-13 DIAGNOSIS — N18.31 STAGE 3A CHRONIC KIDNEY DISEASE (MULTI): ICD-10-CM

## 2025-05-13 DIAGNOSIS — K59.00 CONSTIPATION, UNSPECIFIED CONSTIPATION TYPE: ICD-10-CM

## 2025-05-13 DIAGNOSIS — J30.2 SEASONAL ALLERGIES: ICD-10-CM

## 2025-05-14 RX ORDER — AMLODIPINE BESYLATE 10 MG/1
10 TABLET ORAL DAILY
Qty: 31 TABLET | Refills: 1 | Status: SHIPPED | OUTPATIENT
Start: 2025-05-14

## 2025-05-14 RX ORDER — OXYBUTYNIN CHLORIDE 5 MG/1
TABLET ORAL
Qty: 93 TABLET | Refills: 1 | Status: SHIPPED | OUTPATIENT
Start: 2025-05-14

## 2025-05-14 RX ORDER — LORATADINE 10 MG/1
10 TABLET ORAL DAILY
Qty: 31 TABLET | Refills: 1 | Status: SHIPPED | OUTPATIENT
Start: 2025-05-14

## 2025-05-14 RX ORDER — ATENOLOL 25 MG/1
TABLET ORAL
Qty: 31 TABLET | Refills: 1 | Status: SHIPPED | OUTPATIENT
Start: 2025-05-14

## 2025-05-14 RX ORDER — DOCUSATE SODIUM 100 MG/1
100 CAPSULE, LIQUID FILLED ORAL DAILY
Qty: 31 CAPSULE | Refills: 1 | Status: SHIPPED | OUTPATIENT
Start: 2025-05-14

## 2025-05-14 RX ORDER — HYDROCHLOROTHIAZIDE 25 MG/1
TABLET ORAL
Qty: 31 TABLET | Refills: 1 | Status: SHIPPED | OUTPATIENT
Start: 2025-05-14

## 2025-05-14 RX ORDER — SERTRALINE HYDROCHLORIDE 25 MG/1
TABLET, FILM COATED ORAL
Qty: 93 TABLET | Refills: 1 | Status: SHIPPED | OUTPATIENT
Start: 2025-05-14

## 2025-05-16 DIAGNOSIS — G40.909 SEIZURE DISORDER (MULTI): ICD-10-CM

## 2025-05-16 RX ORDER — LEVETIRACETAM 250 MG/1
TABLET ORAL
Qty: 62 TABLET | Refills: 1 | OUTPATIENT
Start: 2025-05-16

## 2025-05-27 ENCOUNTER — HOME CARE VISIT (OUTPATIENT)
Dept: HOME HEALTH SERVICES | Facility: HOME HEALTH | Age: 35
End: 2025-05-27
Payer: MEDICAID

## 2025-05-27 ENCOUNTER — TELEPHONE (OUTPATIENT)
Dept: PRIMARY CARE | Facility: CLINIC | Age: 35
End: 2025-05-27
Payer: MEDICAID

## 2025-06-03 ENCOUNTER — APPOINTMENT (OUTPATIENT)
Dept: UROLOGY | Facility: CLINIC | Age: 35
End: 2025-06-03
Payer: MEDICAID

## 2025-06-05 ENCOUNTER — HOME CARE VISIT (OUTPATIENT)
Dept: HOME HEALTH SERVICES | Facility: HOME HEALTH | Age: 35
End: 2025-06-05
Payer: MEDICAID

## 2025-06-05 VITALS
SYSTOLIC BLOOD PRESSURE: 122 MMHG | DIASTOLIC BLOOD PRESSURE: 72 MMHG | RESPIRATION RATE: 18 BRPM | OXYGEN SATURATION: 98 % | TEMPERATURE: 97.2 F | HEART RATE: 74 BPM

## 2025-06-05 PROCEDURE — G0299 HHS/HOSPICE OF RN EA 15 MIN: HCPCS

## 2025-06-05 SDOH — ECONOMIC STABILITY: GENERAL

## 2025-06-05 ASSESSMENT — ENCOUNTER SYMPTOMS
DENIES PAIN: 1
APPETITE LEVEL: GOOD

## 2025-06-05 ASSESSMENT — ACTIVITIES OF DAILY LIVING (ADL)
AMBULATION ASSISTANCE: NON-AMBULATORY
MONEY MANAGEMENT (EXPENSES/BILLS): TOTALLY DEPENDENT
CURRENT_FUNCTION: STAND BY ASSIST

## 2025-06-26 ENCOUNTER — HOME CARE VISIT (OUTPATIENT)
Dept: HOME HEALTH SERVICES | Facility: HOME HEALTH | Age: 35
End: 2025-06-26
Payer: MEDICAID

## 2025-07-01 ENCOUNTER — APPOINTMENT (OUTPATIENT)
Dept: UROLOGY | Facility: CLINIC | Age: 35
End: 2025-07-01
Payer: MEDICAID

## 2025-07-03 ENCOUNTER — HOME CARE VISIT (OUTPATIENT)
Dept: HOME HEALTH SERVICES | Facility: HOME HEALTH | Age: 35
End: 2025-07-03
Payer: MEDICAID

## 2025-07-03 VITALS
RESPIRATION RATE: 18 BRPM | HEART RATE: 97 BPM | TEMPERATURE: 97.7 F | DIASTOLIC BLOOD PRESSURE: 65 MMHG | OXYGEN SATURATION: 96 % | SYSTOLIC BLOOD PRESSURE: 120 MMHG

## 2025-07-03 PROCEDURE — G0299 HHS/HOSPICE OF RN EA 15 MIN: HCPCS

## 2025-07-03 ASSESSMENT — ENCOUNTER SYMPTOMS
APPETITE LEVEL: GOOD
DENIES PAIN: 1

## 2025-07-03 ASSESSMENT — ACTIVITIES OF DAILY LIVING (ADL)
CURRENT_FUNCTION: ONE PERSON
OASIS_M1830: 03
AMBULATION ASSISTANCE: NON-AMBULATORY
ENTERING_EXITING_HOME: INDEPENDENT

## 2025-07-31 ENCOUNTER — HOME CARE VISIT (OUTPATIENT)
Dept: HOME HEALTH SERVICES | Facility: HOME HEALTH | Age: 35
End: 2025-07-31
Payer: MEDICAID

## 2025-07-31 VITALS — HEART RATE: 70 BPM | TEMPERATURE: 98 F | RESPIRATION RATE: 18 BRPM | OXYGEN SATURATION: 98 %

## 2025-07-31 PROCEDURE — G0299 HHS/HOSPICE OF RN EA 15 MIN: HCPCS

## 2025-07-31 SDOH — ECONOMIC STABILITY: GENERAL

## 2025-07-31 ASSESSMENT — ENCOUNTER SYMPTOMS
APPETITE LEVEL: GOOD
DENIES PAIN: 1

## 2025-07-31 ASSESSMENT — ACTIVITIES OF DAILY LIVING (ADL)
AMBULATION ASSISTANCE: NON-AMBULATORY
CURRENT_FUNCTION: ONE PERSON
MONEY MANAGEMENT (EXPENSES/BILLS): TOTALLY DEPENDENT

## 2025-08-11 DIAGNOSIS — Z93.59 PRESENCE OF SUPRAPUBIC CATHETER (MULTI): Primary | ICD-10-CM

## 2025-08-11 DIAGNOSIS — N31.9 NEUROGENIC BLADDER: ICD-10-CM

## 2025-08-11 RX ORDER — CIPROFLOXACIN 500 MG/1
500 TABLET, FILM COATED ORAL DAILY
Qty: 1 TABLET | Refills: 0 | Status: SHIPPED | OUTPATIENT
Start: 2025-08-11

## 2025-08-12 DIAGNOSIS — J30.2 SEASONAL ALLERGIES: ICD-10-CM

## 2025-08-12 DIAGNOSIS — F32.A DEPRESSIVE DISORDER: ICD-10-CM

## 2025-08-12 DIAGNOSIS — I10 BENIGN ESSENTIAL HYPERTENSION: ICD-10-CM

## 2025-08-12 DIAGNOSIS — N18.31 STAGE 3A CHRONIC KIDNEY DISEASE (MULTI): ICD-10-CM

## 2025-08-12 DIAGNOSIS — K59.00 CONSTIPATION, UNSPECIFIED CONSTIPATION TYPE: ICD-10-CM

## 2025-08-12 RX ORDER — HYDROCHLOROTHIAZIDE 25 MG/1
TABLET ORAL
Qty: 31 TABLET | Refills: 11 | OUTPATIENT
Start: 2025-08-12

## 2025-08-12 RX ORDER — ATENOLOL 25 MG/1
25 TABLET ORAL DAILY
Qty: 31 TABLET | Refills: 11 | OUTPATIENT
Start: 2025-08-12

## 2025-08-12 RX ORDER — OXYBUTYNIN CHLORIDE 5 MG/1
TABLET ORAL
Qty: 93 TABLET | Refills: 11 | OUTPATIENT
Start: 2025-08-12

## 2025-08-12 RX ORDER — LORATADINE 10 MG/1
TABLET ORAL
Qty: 31 TABLET | Refills: 11 | OUTPATIENT
Start: 2025-08-12

## 2025-08-12 RX ORDER — DOCUSATE SODIUM 100 MG/1
100 CAPSULE, LIQUID FILLED ORAL DAILY
Qty: 31 CAPSULE | Refills: 11 | OUTPATIENT
Start: 2025-08-12

## 2025-08-12 RX ORDER — AMLODIPINE BESYLATE 10 MG/1
10 TABLET ORAL DAILY
Qty: 31 TABLET | Refills: 11 | OUTPATIENT
Start: 2025-08-12

## 2025-08-12 RX ORDER — SERTRALINE HYDROCHLORIDE 25 MG/1
TABLET, FILM COATED ORAL
Qty: 93 TABLET | Refills: 11 | OUTPATIENT
Start: 2025-08-12

## 2025-08-22 ENCOUNTER — APPOINTMENT (OUTPATIENT)
Dept: PRIMARY CARE | Facility: CLINIC | Age: 35
End: 2025-08-22
Payer: MEDICAID

## 2025-08-22 ENCOUNTER — TELEPHONE (OUTPATIENT)
Dept: PRIMARY CARE | Facility: CLINIC | Age: 35
End: 2025-08-22

## 2025-08-22 DIAGNOSIS — K59.00 CONSTIPATION, UNSPECIFIED CONSTIPATION TYPE: ICD-10-CM

## 2025-08-22 DIAGNOSIS — I10 BENIGN ESSENTIAL HYPERTENSION: ICD-10-CM

## 2025-08-22 DIAGNOSIS — Z93.59 PRESENCE OF SUPRAPUBIC CATHETER (MULTI): ICD-10-CM

## 2025-08-22 DIAGNOSIS — N18.31 STAGE 3A CHRONIC KIDNEY DISEASE (MULTI): ICD-10-CM

## 2025-08-22 DIAGNOSIS — J30.2 SEASONAL ALLERGIES: ICD-10-CM

## 2025-08-22 DIAGNOSIS — G40.909 SEIZURE DISORDER (MULTI): ICD-10-CM

## 2025-08-22 RX ORDER — AMLODIPINE BESYLATE 10 MG/1
10 TABLET ORAL DAILY
Qty: 31 TABLET | Refills: 1 | OUTPATIENT
Start: 2025-08-22

## 2025-08-22 RX ORDER — LANOLIN/MINERAL OIL
LOTION (ML) TOPICAL
Qty: 236 ML | Refills: 11 | OUTPATIENT
Start: 2025-08-22

## 2025-08-22 RX ORDER — ATENOLOL 25 MG/1
TABLET ORAL
Qty: 31 TABLET | Refills: 1 | OUTPATIENT
Start: 2025-08-22

## 2025-08-22 RX ORDER — LORATADINE 10 MG/1
10 TABLET ORAL DAILY
Qty: 31 TABLET | Refills: 1 | OUTPATIENT
Start: 2025-08-22

## 2025-08-22 RX ORDER — LEVETIRACETAM 250 MG/1
TABLET ORAL
OUTPATIENT
Start: 2025-08-22

## 2025-08-22 RX ORDER — DOCUSATE SODIUM 100 MG/1
100 CAPSULE, LIQUID FILLED ORAL DAILY
Qty: 31 CAPSULE | Refills: 1 | OUTPATIENT
Start: 2025-08-22

## 2025-08-22 RX ORDER — OXYBUTYNIN CHLORIDE 5 MG/1
TABLET ORAL
Qty: 93 TABLET | Refills: 1 | OUTPATIENT
Start: 2025-08-22

## 2025-08-29 ENCOUNTER — HOME CARE VISIT (OUTPATIENT)
Dept: HOME HEALTH SERVICES | Facility: HOME HEALTH | Age: 35
End: 2025-08-29
Payer: MEDICAID

## 2025-08-29 VITALS
TEMPERATURE: 97.7 F | SYSTOLIC BLOOD PRESSURE: 124 MMHG | HEART RATE: 70 BPM | OXYGEN SATURATION: 98 % | RESPIRATION RATE: 18 BRPM | DIASTOLIC BLOOD PRESSURE: 70 MMHG

## 2025-08-29 PROCEDURE — G0299 HHS/HOSPICE OF RN EA 15 MIN: HCPCS

## 2025-08-29 SDOH — ECONOMIC STABILITY: GENERAL

## 2025-08-29 ASSESSMENT — ENCOUNTER SYMPTOMS
DENIES PAIN: 1
APPETITE LEVEL: GOOD

## 2025-08-29 ASSESSMENT — ACTIVITIES OF DAILY LIVING (ADL)
CURRENT_FUNCTION: ONE PERSON
MONEY MANAGEMENT (EXPENSES/BILLS): TOTALLY DEPENDENT
AMBULATION ASSISTANCE: NON-AMBULATORY

## 2025-09-02 ENCOUNTER — HOME CARE VISIT (OUTPATIENT)
Dept: HOME HEALTH SERVICES | Facility: HOME HEALTH | Age: 35
End: 2025-09-02
Payer: MEDICAID

## 2025-09-02 VITALS
RESPIRATION RATE: 18 BRPM | TEMPERATURE: 97.5 F | DIASTOLIC BLOOD PRESSURE: 70 MMHG | HEART RATE: 68 BPM | OXYGEN SATURATION: 97 % | SYSTOLIC BLOOD PRESSURE: 120 MMHG

## 2025-09-02 PROCEDURE — G0299 HHS/HOSPICE OF RN EA 15 MIN: HCPCS

## 2025-09-02 ASSESSMENT — ACTIVITIES OF DAILY LIVING (ADL)
ENTERING_EXITING_HOME: MODERATE ASSIST
CURRENT_FUNCTION: ONE PERSON
AMBULATION ASSISTANCE: NON-AMBULATORY
OASIS_M1830: 03

## 2025-09-02 ASSESSMENT — ENCOUNTER SYMPTOMS
ANGER WITHIN DEFINED LIMITS: 1
SLEEP QUALITY: ADEQUATE
DENIES PAIN: 1
APPETITE LEVEL: GOOD
AGGRESSION WITHIN DEFINED LIMITS: 1